# Patient Record
Sex: FEMALE | NOT HISPANIC OR LATINO | Employment: OTHER | URBAN - METROPOLITAN AREA
[De-identification: names, ages, dates, MRNs, and addresses within clinical notes are randomized per-mention and may not be internally consistent; named-entity substitution may affect disease eponyms.]

---

## 2018-07-12 ASSESSMENT — MIFFLIN-ST. JEOR: SCORE: 1687.13

## 2018-07-18 ENCOUNTER — ANESTHESIA - HEALTHEAST (OUTPATIENT)
Dept: SURGERY | Facility: CLINIC | Age: 68
End: 2018-07-18

## 2018-07-18 ENCOUNTER — SURGERY - HEALTHEAST (OUTPATIENT)
Dept: SURGERY | Facility: CLINIC | Age: 68
End: 2018-07-18

## 2018-07-18 ASSESSMENT — MIFFLIN-ST. JEOR: SCORE: 1677.09

## 2018-07-19 ENCOUNTER — HOME CARE/HOSPICE - HEALTHEAST (OUTPATIENT)
Dept: HOME HEALTH SERVICES | Facility: HOME HEALTH | Age: 68
End: 2018-07-19

## 2018-07-22 ENCOUNTER — HOME CARE/HOSPICE - HEALTHEAST (OUTPATIENT)
Dept: HOME HEALTH SERVICES | Facility: HOME HEALTH | Age: 68
End: 2018-07-22

## 2018-07-24 ENCOUNTER — HOME CARE/HOSPICE - HEALTHEAST (OUTPATIENT)
Dept: HOME HEALTH SERVICES | Facility: HOME HEALTH | Age: 68
End: 2018-07-24

## 2018-07-26 ENCOUNTER — HOME CARE/HOSPICE - HEALTHEAST (OUTPATIENT)
Dept: HOME HEALTH SERVICES | Facility: HOME HEALTH | Age: 68
End: 2018-07-26

## 2018-07-27 ENCOUNTER — HOME CARE/HOSPICE - HEALTHEAST (OUTPATIENT)
Dept: HOME HEALTH SERVICES | Facility: HOME HEALTH | Age: 68
End: 2018-07-27

## 2018-07-30 ENCOUNTER — HOME CARE/HOSPICE - HEALTHEAST (OUTPATIENT)
Dept: HOME HEALTH SERVICES | Facility: HOME HEALTH | Age: 68
End: 2018-07-30

## 2018-08-01 ENCOUNTER — HOME CARE/HOSPICE - HEALTHEAST (OUTPATIENT)
Dept: HOME HEALTH SERVICES | Facility: HOME HEALTH | Age: 68
End: 2018-08-01

## 2018-08-03 ENCOUNTER — HOME CARE/HOSPICE - HEALTHEAST (OUTPATIENT)
Dept: HOME HEALTH SERVICES | Facility: HOME HEALTH | Age: 68
End: 2018-08-03

## 2018-08-06 ENCOUNTER — HOME CARE/HOSPICE - HEALTHEAST (OUTPATIENT)
Dept: HOME HEALTH SERVICES | Facility: HOME HEALTH | Age: 68
End: 2018-08-06

## 2018-08-08 ENCOUNTER — HOME CARE/HOSPICE - HEALTHEAST (OUTPATIENT)
Dept: HOME HEALTH SERVICES | Facility: HOME HEALTH | Age: 68
End: 2018-08-08

## 2018-08-10 ENCOUNTER — HOME CARE/HOSPICE - HEALTHEAST (OUTPATIENT)
Dept: HOME HEALTH SERVICES | Facility: HOME HEALTH | Age: 68
End: 2018-08-10

## 2021-06-01 VITALS — WEIGHT: 263 LBS | BODY MASS INDEX: 46.6 KG/M2 | HEIGHT: 63 IN

## 2021-06-19 NOTE — ANESTHESIA POSTPROCEDURE EVALUATION
Patient: Shelby Underwood   LEFT TOTAL KNEE ARTHROPLASTY  Anesthesia type: spinal    Patient location: PACU  Last vitals:   Vitals:    07/18/18 1230   BP: 110/68   Pulse: 73   Resp: 15   Temp:    SpO2: 96%     Post vital signs: stable  Level of consciousness: awake and responds to simple questions  Post-anesthesia pain: pain controlled  Post-anesthesia nausea and vomiting: no  Pulmonary: unassisted, nasal cannula  Cardiovascular: stable and blood pressure at baseline  Hydration: adequate  Anesthetic events: no    QCDR Measures:  ASA# 11 - Swathi-op Cardiac Arrest: ASA11B - Patient did NOT experience unanticipated cardiac arrest  ASA# 12 - Swathi-op Mortality Rate: ASA12B - Patient did NOT die  ASA# 13 - PACU Re-Intubation Rate: NA - No ETT / LMA used for case  ASA# 10 - Composite Anes Safety: ASA10A - No serious adverse event    Additional Notes:

## 2021-06-19 NOTE — ANESTHESIA PROCEDURE NOTES
Spinal Block    Start time: 7/18/2018 9:10 AM  End time: 7/18/2018 9:13 AM  Reason for block: primary anesthetic    Staffing:  Performing  Anesthesiologist: MILA DE LA TORRE    Preanesthetic Checklist  Completed: patient identified, risks, benefits, and alternatives discussed, timeout performed, consent obtained, airway assessed, oxygen available, suction available, emergency drugs available and hand hygiene performed  Spinal Block  Patient position: sitting  Prep: ChloraPrep  Patient monitoring: heart rate, cardiac monitor, continuous pulse ox and blood pressure  Approach: midline  Location: L4-5  Injection technique: single-shot  Needle type: pencil-tip   Needle gauge: 22 G    Assessment  Sensory level: T8

## 2021-06-19 NOTE — ANESTHESIA PREPROCEDURE EVALUATION
Anesthesia Evaluation      Patient summary reviewed   History of anesthetic complications     Airway   Mallampati: II   Pulmonary - normal exam   (+) pneumonia, COPD, asthma                           Cardiovascular   Rhythm: regular  Rate: normal,         Neuro/Psych      Endo/Other       GI/Hepatic/Renal       Other findings:   Asthma    Bipolar disorder (H)   Cellulitis and abscess of oral soft tissues    Cervicalgia   Controlled substance agreement signed    COPD (chronic obstructive pulmonary disease) (H)    Schizoaffective disorder (H)     History of anesthesia complications anxiety upon awakening after previous knee replacement   Osteoarthritis             Dental - normal exam                        Anesthesia Plan  Planned anesthetic: spinal  SAB + SAPH/ TIB BLOCKS  VERSED PRIOR TO PACU  ASA 3     Anesthetic plan and risks discussed with: patient  Anesthesia plan special considerations: antiemetics,   Post-op plan: routine recovery

## 2021-06-19 NOTE — ANESTHESIA PROCEDURE NOTES
Peripheral Block    Patient location during procedure: pre-op  Start time: 7/18/2018 8:00 AM  End time: 7/18/2018 8:05 AM  post-op analgesia per surgeon order as noted in medical record  Staffing:  Performing  Anesthesiologist: MILA DE LA TORRE  Preanesthetic Checklist  Completed: patient identified, site marked, risks, benefits, and alternatives discussed, timeout performed, consent obtained, airway assessed, oxygen available, suction available, emergency drugs available and hand hygiene performed  Peripheral Block  Nerve block type: TIB.  Prep: ChloraPrep  Patient position: supine  Patient monitoring: cardiac monitor, continuous pulse oximetry, heart rate and blood pressure  Laterality: left  Injection technique: ultrasound guided    Ultrasound used to visualize needle placement in proximity to nerve being blocked: yes   Permanent ultrasound image captured for medical record  Sterile gel and probe cover used for ultrasound.    Needle  Needle type: Stimuplex   Needle gauge: 21 G  Needle length: 4 in  no peripheral nerve catheter placed  Assessment  Injection assessment: no difficulty with injection, negative aspiration for heme, no paresthesia on injection and incremental injection

## 2021-06-19 NOTE — ANESTHESIA PROCEDURE NOTES
Peripheral Block    Patient location during procedure: pre-op  Start time: 7/18/2018 8:05 AM  End time: 7/18/2018 8:06 AM    Staffing:  Performing  Anesthesiologist: MILA DE LA TORRE  Preanesthetic Checklist  Completed: patient identified, site marked, risks, benefits, and alternatives discussed, timeout performed, consent obtained, airway assessed, oxygen available, suction available, emergency drugs available and hand hygiene performed  Peripheral Block  Block type: saphenous  Prep: ChloraPrep  Patient position: supine  Patient monitoring: cardiac monitor, continuous pulse oximetry, heart rate and blood pressure  Laterality: left  Injection technique: ultrasound guided    Ultrasound used to visualize needle placement in proximity to nerve being blocked: yes   Permanent ultrasound image captured for medical record  Sterile gel and probe cover used for ultrasound.    Needle  Needle type: Stimuplex   Needle gauge: 21 G  Needle length: 6 in  no peripheral nerve catheter placed  Assessment  Injection assessment: no difficulty with injection, negative aspiration for heme, no paresthesia on injection and incremental injection

## 2021-06-19 NOTE — ANESTHESIA CARE TRANSFER NOTE
Last vitals:   Vitals:    07/18/18 1147   BP: 122/72   Pulse: 80   Resp: 10   Temp: 36.6  C (97.8  F)   SpO2: 97%     Patient's level of consciousness is drowsy  Spontaneous respirations: yes  Maintains airway independently: yes  Dentition unchanged: yes  Oropharynx: oropharynx clear of all foreign objects    QCDR Measures:  ASA# 20 - Surgical Safety Checklist: WHO surgical safety checklist completed prior to induction  PQRS# 430 - Adult PONV Prevention: 4558F - Pt received => 2 anti-emetic agents (different classes) preop & intraop  ASA# 8 - Peds PONV Prevention: NA - Not pediatric patient, not GA or 2 or more risk factors NOT present  PQRS# 424 - Swathi-op Temp Management: 4559F - At least one body temp DOCUMENTED => 35.5C or 95.9F within required timeframe  PQRS# 426 - PACU Transfer Protocol: - Transfer of care checklist used  ASA# 14 - Acute Post-op Pain: ASA14B - Patient did NOT experience pain >= 7 out of 10

## 2022-06-28 ENCOUNTER — TRANSFERRED RECORDS (OUTPATIENT)
Dept: HEALTH INFORMATION MANAGEMENT | Facility: CLINIC | Age: 72
End: 2022-06-28

## 2022-07-08 ENCOUNTER — MEDICAL CORRESPONDENCE (OUTPATIENT)
Dept: HEALTH INFORMATION MANAGEMENT | Facility: CLINIC | Age: 72
End: 2022-07-08

## 2022-07-08 ENCOUNTER — TRANSFERRED RECORDS (OUTPATIENT)
Dept: HEALTH INFORMATION MANAGEMENT | Facility: CLINIC | Age: 72
End: 2022-07-08

## 2022-07-14 ENCOUNTER — TRANSCRIBE ORDERS (OUTPATIENT)
Dept: OTHER | Age: 72
End: 2022-07-14

## 2022-07-14 DIAGNOSIS — M54.2 NECK PAIN: ICD-10-CM

## 2022-07-14 DIAGNOSIS — M79.641 PAIN OF RIGHT HAND: ICD-10-CM

## 2022-07-14 DIAGNOSIS — M62.81 MUSCLE WEAKNESS: Primary | ICD-10-CM

## 2022-08-21 ENCOUNTER — HEALTH MAINTENANCE LETTER (OUTPATIENT)
Age: 72
End: 2022-08-21

## 2022-09-06 ENCOUNTER — OFFICE VISIT (OUTPATIENT)
Dept: NEUROLOGY | Facility: CLINIC | Age: 72
End: 2022-09-06
Payer: COMMERCIAL

## 2022-09-06 VITALS — SYSTOLIC BLOOD PRESSURE: 113 MMHG | HEART RATE: 62 BPM | DIASTOLIC BLOOD PRESSURE: 69 MMHG | OXYGEN SATURATION: 95 %

## 2022-09-06 DIAGNOSIS — M62.81 MUSCLE WEAKNESS: ICD-10-CM

## 2022-09-06 DIAGNOSIS — M79.641 PAIN OF RIGHT HAND: ICD-10-CM

## 2022-09-06 DIAGNOSIS — M54.2 NECK PAIN: ICD-10-CM

## 2022-09-06 PROCEDURE — 99204 OFFICE O/P NEW MOD 45 MIN: CPT | Mod: GC

## 2022-09-06 RX ORDER — CHLORHEXIDINE GLUCONATE ORAL RINSE 1.2 MG/ML
SOLUTION DENTAL
COMMUNITY
Start: 2022-03-24 | End: 2022-12-02

## 2022-09-06 RX ORDER — TRAZODONE HYDROCHLORIDE 50 MG/1
50 TABLET, FILM COATED ORAL
COMMUNITY
Start: 2022-04-04

## 2022-09-06 RX ORDER — LAMOTRIGINE 100 MG/1
100 TABLET ORAL
COMMUNITY
Start: 2022-07-07
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
## 2022-09-06 NOTE — PATIENT INSTRUCTIONS
- We are going to repeat your EMG of your arms since the imaging and recent EMG results don't make sense.  Someone will call you to schedule  - In the meantime, make sure that you keep your hand stretched out to avoid joint problems.

## 2022-09-06 NOTE — PROGRESS NOTES
"Shelby Underwood is a 72 year old female who presents for:  Chief Complaint   Patient presents with     Consult     Right arms pain and weakness (can't lift arm), left leg (thigh) is so weak unable to walk at this time         Initial Vitals:  /69 (BP Location: Right arm, Patient Position: Sitting, Cuff Size: Adult Regular)   Pulse 62   SpO2 95%  Estimated body mass index is 46.59 kg/m  as calculated from the following:    Height as of 7/18/18: 1.6 m (5' 3\").    Weight as of 7/18/18: 119.3 kg (263 lb).. There is no height or weight on file to calculate BSA. BP completed using cuff size: regular      Malena Lake  "

## 2022-09-06 NOTE — Clinical Note
Charisse Riojas would like to have this person called tomorrow to schedule and EMG with her at your clinic. She said she is getting some adjustments made to the schedule and thinks she can get patient in sooner than over here in [place].  Thanks, Malena
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
## 2022-10-05 ENCOUNTER — OFFICE VISIT (OUTPATIENT)
Dept: NEUROLOGY | Facility: CLINIC | Age: 72
End: 2022-10-05
Attending: PSYCHIATRY & NEUROLOGY
Payer: COMMERCIAL

## 2022-10-05 DIAGNOSIS — M62.81 MUSCLE WEAKNESS: ICD-10-CM

## 2022-10-05 DIAGNOSIS — M54.2 NECK PAIN: ICD-10-CM

## 2022-10-05 DIAGNOSIS — M79.641 PAIN OF RIGHT HAND: ICD-10-CM

## 2022-10-05 DIAGNOSIS — G54.0 BRACHIAL PLEXOPATHY: Primary | ICD-10-CM

## 2022-10-05 PROCEDURE — 95911 NRV CNDJ TEST 9-10 STUDIES: CPT | Performed by: PSYCHIATRY & NEUROLOGY

## 2022-10-05 PROCEDURE — 95886 MUSC TEST DONE W/N TEST COMP: CPT | Performed by: PSYCHIATRY & NEUROLOGY

## 2022-10-05 NOTE — LETTER
10/5/2022         RE: Shelby Underwood  [address] [place] [address]        Dear Colleague,    Thank you for referring your patient, Shelby Underwood, to the [facility]. Please see a copy of my visit note below.    See procedure note      Again, thank you for allowing me to participate in the care of your patient.        Sincerely,        Malena Riojas MD

## 2022-10-05 NOTE — PROCEDURES
[facility]  Electrodiagnostic Laboratory                 Department of Neurology                                                                                                         Test Date:  10/5/2022    Patient: Cam Underwood : 1950 Physician: Malena Riojas MD   Sex: Female AGE: 72 year Ref Phys:    ID#: [id number]   Technician: RIDDHI Echeverria     Clinical Information:  The patient presented with complaint sof right arm weakness, on exam right > left weakness    Techniques:  Motor and sensory conduction studies were done with surface recording electrodes. EMG was done with a concentric needle electrode.     Results:  Motor nerve conduction studies:  Left median motor study reveals normal distal latency, amplitude and conduction velocity.  Right median motor study reveals normal distal latency amplitude and conduction velocity.  Left ulnar motor study reveals normal distal latency, low amplitude and normal conduction velocity.  Right ulnar motor study reveals normal distal latency, normal amplitude and conduction velocity.     Sensory nerve conduction studies:  Median antidromic sensory studies bilaterally are within normal limits.  Radial sensory studies compared bilaterally are within normal limits.  Ulnar antidromic studies bilaterally are within normal limits.    Needle examination:  In the left upper extremity the patient had increased insertional activity with fibrillation potentials present in the left FDI.  With activation those motor units appeared large in amplitude and duration with a moderate reduction in requirement.  The remainder of the needle examination of the left upper extremity was normal.  In the right upper extremity the patient had chronic neurogenic changes seen in the right triceps.  In the right flexor carpi ulnaris, FDI and EIP the patient had increased insertional activity with fibrillation potentials present.  She had difficulty  activating the flexor carpi ulnaris but the FDI and EIP showed neurogenic changes present.  The right APB was normal.  Cervical paraspinals were then explored in insertional activity was difficult to obtain clear results due to the patient's inability to relax.  We performed paraspinals with her sitting up due to significant discomfort laying on her side.  I did not appreciate any clear fibrillation potentials present but again the muscle was somewhat tense.  With activation the patient's paraspinals appeared smaller on the right side and C7 and C8 distribution on the left and both amplitude and duration.    Interpretation:    This is an abnormal EMG.  The findings are suggestive of C7 and more so C8 distribution nerve injury in the right upper extremity.  Left upper extremity may also have a mild ulnar neuropathy.  An inferior trunk brachial plexus injury could possibly elicit similar symptoms but is less likely.  The small motor unit seen in the paraspinals are of unclear significance and clinical correlation is advised.    ___________________________  Malena Riojas MD        Nerve Conduction Studies  Motor Sites      Latency Amplitude Neg. Amp Diff Segment Distance Velocity Neg. Dur Neg Area Diff Temperature Comment   Site (ms) Norm (mV) Norm %  cm m/s Norm ms %  C    Left Median (APB) Motor   Wrist 2.7  < 4.4 7.5  > 5.0  Wrist-APB 8   4.6  32.9    Elbow 6.6 - 7.4 - -1.33 Elbow-Wrist 20.5 53  > 48 4.7 -3.3 32.8    Right Median (APB) Motor   Wrist 3.3  < 4.4 5.6  > 5.0  Wrist-APB 8   4.0  32.9    Elbow 7.3 - 5.3 - -5.4 Elbow-Wrist 21 53  > 48 4.1 -0.83 33    Left Ulnar (ADM) Motor   Wrist 3.5  < 3.5 4.5  > 5.0  Wrist-ADM 8   4.9  32.9    Bel Elbow 6.5 - 4.7 - 4.4 Bel Elbow-Wrist 19 63  > 48 5.1 2.7 32.9    Abv Elbow 8.1 - 4.4 - -6.4 Abv Elbow-Bel Elbow 9 56  > 48 5.7 0.67 32.8    Right Ulnar (ADM) Motor   Wrist 2.7  < 3.5 7.5  > 5.0  Wrist-ADM 8   4.6  32.9    Bel Elbow 6.1 - 7.5 - 0 Bel Elbow-Wrist 20 59  > 48  4.5 -1.46 32.9    Abv Elbow 7.9 - 7.0 - -6.7 Abv Elbow-Bel Elbow 10 56  > 48 4.9 -0.99 32.9      Sensory Sites      Onset Lat Peak Lat Amp (O-P) Amp (P-P) Segment Distance Velocity Temperature Comment   Site ms ms  V Norm  V  cm m/s Norm  C    Left Median Sensory   Wrist-Dig II 1.95 2.6 24  > 10 33 Wrist-Dig II 14 72  > 48 32.7    Right Median Sensory   Wrist-Dig II 2.2 3.1 15  > 10 17 Wrist-Dig II 14 64  > 48 32.9    Left Radial Sensory   Forearm-Wrist 1.48 2.0 30  > 15 40 Forearm-Wrist 10 68 - 24.1    Right Radial Sensory   Forearm-Wrist 1.63 2.2 31  > 15 37 Forearm-Wrist 10 61 - 32.9    Left Ulnar Sensory   Wrist-Dig V 1.75 2.4 8  > 8 20 Wrist-Dig V 12.5 71  > 48 32.9    Right Ulnar Sensory   Wrist-Dig V 2.0 2.8 10  > 8 22 Wrist-Dig V 12.5 63  > 48 32.9        Electromyography     Side Muscle Ins Act Fibs/PSW Fasc HF Amp Dur Poly Recrt Int Pat   Left Deltoid Nml None Nml 0 Nml Nml 0 Nml Nml   Left Triceps Nml None Nml 0 Nml Nml 0 Nml Nml   Left Biceps Nml None Nml 0 Nml Nml 0 Nml Nml   Left Pronator Teres Nml None Nml 0 Nml Nml 0 Nml Nml   Left FDI Incr 1+ Nml 0 1+ 1+ 0 ModRed Nml   Right Deltoid Nml None Nml 0 Nml Nml 0 Nml Nml   Right Biceps Nml None Nml 0 Nml Nml 0 Nml Nml   Right Triceps Nml None Nml 0 2+ 2+ 0 ModRed Nml   Right Pronator Teres Nml None Nml 0 Nml Nml 0 Nml Nml   Right FCU Incr 3+ Nml 0 - - - - -   Right FDI Incr 1+ Nml 0 2+ 2+ 2+ Nml Nml   Right EIP Incr 2+ Nml 0 1+ 1+ 0 SevRed Nml   Right APB Nml None Nml 0 Nml Nml 0 Nml Nml   Right C7 Parasp Nml None Nml 0 1- 1-      Right C8 Parasp Nml None Nml 0 1- 1-      Left C8 Parasp Nml None Nml 0 Nml Nml      Left C7 Parasp Nml None Nml 0 Nml Nml            NCS Waveforms:    Motor                Sensory

## 2022-10-10 ENCOUNTER — TELEPHONE (OUTPATIENT)
Dept: NEUROLOGY | Facility: CLINIC | Age: 72
End: 2022-10-10

## 2022-10-10 DIAGNOSIS — M54.12 BRACHIAL NEURITIS OR RADICULITIS: Primary | ICD-10-CM

## 2022-10-10 RX ORDER — DIAZEPAM 5 MG
5 TABLET ORAL
Qty: 1 TABLET | Refills: 0 | Status: SHIPPED | OUTPATIENT
Start: 2022-10-10 | End: 2022-12-02

## 2022-10-17 ENCOUNTER — TELEPHONE (OUTPATIENT)
Dept: NEUROLOGY | Facility: CLINIC | Age: 72
End: 2022-10-17

## 2022-10-17 NOTE — TELEPHONE ENCOUNTER
Called pt to check if she had gotten labs done that were requested by Dr. Riojas. [place].     Carmelina LARSON RN, BSN  [facility] Neurology [facility]

## 2022-10-18 NOTE — TELEPHONE ENCOUNTER
Health Call Center    Phone Message    May a detailed message be left on voicemail: yes     Reason for Call: Other: Patient is returning a call regarding her lab orders. Patient is scheduled to complete labs on 10/24/2022. Please call patient back if there is anything else to relay to the patient at # [phone]     Action Taken: Message routed to:  Other: [facility]     Travel Screening: Not Applicable

## 2022-10-19 NOTE — TELEPHONE ENCOUNTER
No further recommendations at this time. Will close encounter.   Carmelina LARSON RN, BSN  [facility]

## 2022-10-24 ENCOUNTER — HOSPITAL ENCOUNTER (OUTPATIENT)
Dept: MRI IMAGING | Facility: CLINIC | Age: 72
Discharge: HOME | End: 2022-10-24
Attending: PSYCHIATRY & NEUROLOGY
Payer: COMMERCIAL

## 2022-10-24 ENCOUNTER — LAB (OUTPATIENT)
Dept: LAB | Facility: CLINIC | Age: 72
End: 2022-10-24
Attending: PSYCHIATRY & NEUROLOGY
Payer: COMMERCIAL

## 2022-10-24 DIAGNOSIS — G54.0 BRACHIAL PLEXOPATHY: ICD-10-CM

## 2022-10-24 DIAGNOSIS — M62.81 MUSCLE WEAKNESS: ICD-10-CM

## 2022-10-24 DIAGNOSIS — M54.2 NECK PAIN: ICD-10-CM

## 2022-10-24 LAB
CK SERPL-CCNC: 129 U/L (ref 30–225)
CRP SERPL-MCNC: 5.2 MG/L (ref 0–8)
ERYTHROCYTE [SEDIMENTATION RATE] IN BLOOD BY WESTERGREN METHOD: 10 MM/HR (ref 0–30)

## 2022-10-24 PROCEDURE — A9585 GADOBUTROL INJECTION: HCPCS | Performed by: PSYCHIATRY & NEUROLOGY

## 2022-10-24 PROCEDURE — 72141 MRI NECK SPINE W/O DYE: CPT

## 2022-10-24 PROCEDURE — 85652 RBC SED RATE AUTOMATED: CPT

## 2022-10-24 PROCEDURE — 82550 ASSAY OF CK (CPK): CPT

## 2022-10-24 PROCEDURE — 255N000002 HC RX 255 OP 636: Performed by: PSYCHIATRY & NEUROLOGY

## 2022-10-24 PROCEDURE — 36415 COLL VENOUS BLD VENIPUNCTURE: CPT

## 2022-10-24 PROCEDURE — 72156 MRI NECK SPINE W/O & W/DYE: CPT

## 2022-10-24 PROCEDURE — 86140 C-REACTIVE PROTEIN: CPT

## 2022-10-24 RX ORDER — GADOBUTROL 604.72 MG/ML
12 INJECTION INTRAVENOUS ONCE
Status: COMPLETED | OUTPATIENT
Start: 2022-10-24 | End: 2022-10-24

## 2022-10-24 RX ADMIN — GADOBUTROL 12 ML: 604.72 INJECTION INTRAVENOUS at 18:32

## 2022-10-25 ENCOUNTER — OFFICE VISIT (OUTPATIENT)
Dept: NEUROLOGY | Facility: CLINIC | Age: 72
End: 2022-10-25
Payer: COMMERCIAL

## 2022-10-25 VITALS — OXYGEN SATURATION: 95 % | HEART RATE: 79 BPM | SYSTOLIC BLOOD PRESSURE: 128 MMHG | DIASTOLIC BLOOD PRESSURE: 70 MMHG

## 2022-10-25 DIAGNOSIS — M62.81 MUSCLE WEAKNESS OF RIGHT UPPER EXTREMITY: ICD-10-CM

## 2022-10-25 DIAGNOSIS — R29.898 BILATERAL LEG WEAKNESS: Primary | ICD-10-CM

## 2022-10-25 DIAGNOSIS — R29.2 HYPERREFLEXIA: ICD-10-CM

## 2022-10-25 PROCEDURE — 99215 OFFICE O/P EST HI 40 MIN: CPT | Mod: GC

## 2022-10-25 RX ORDER — DULOXETIN HYDROCHLORIDE 20 MG/1
30 CAPSULE, DELAYED RELEASE ORAL DAILY
COMMUNITY
Start: 2022-10-06

## 2022-10-25 NOTE — LETTER
10/25/2022         RE: Shelby Underwood  [address] [place] [address]        Dear Colleague,    Thank you for referring your patient, Shelby Underwood, to the [facility] NEUROLOGY [facility]. Please see a copy of my visit note below.    [facility]  Section of General Neurology  Return Patient Visit    Shelby Underwood MRN# [id number]   Age: 72 year old YOB: 1950     Brief history of symptoms: The patient was initially seen in neurologic consultation on September 6, 2022 for evaluation of right arm dysfunction and weakness with associated neck pain with additional complaint of bilateral hamstring pain. Please see the comprehensive neurologic consultation note from that date in the Epic records for details.     This patient complains of right hand contracture, left C8 distribution numbness, neck pain, and bilateral hamstring discomfort/weakness along with difficulty walking         Assessment and Plan:   Assessment:  Mare Underwood is a 72-year-old patient presenting for follow-up today in regard to her bilateral upper and lower extremity symptoms and neck pain.  Patient recently underwent EMG and nerve conduction study testing, obtained blood work, as well as brachial plexus and cervical spine MRI.  Although some of this patient's symptoms i.e. right upper extremity weakness and numbness can likely be explained by abnormal cervical MRI findings, after further evaluation we cannot rule out upper motor neuron involvement with differential including motor neuron disease (bilateral hypothenar/right FDI atrophy, thenar muscles fasciculations, and abnormally brisk reflexes of the right upper extremity) versus brain lesion.  MRI not showing significant spinal cord compression, and would expect patient to be hyporeflexic with an associated nerve root impingement.  In addition, this patient is complaining of bilateral hamstring weakness and discomfort that began at  about the same time as her upper extremity symptoms.  Further investigation required.  We will plan on ordering brain, cervical, thoracic, and lumbar MRI for now.  If imaging is negative, we will plan on neuromuscular referral with additional bilateral upper and lower extremity EMG to evaluate for motor neuron disease.     Plan:  -MRI brain, cervical, thoracic, and lumbar spine  -Follow-up once testing is done to discuss results and next steps       Kwan Ernandez DO  Resident Physician of Neurology  [facility]      Interval history:   Patient states that her symptoms have been stable over the last month or so.  No new complaints today.  Patient still getting around with a wheelchair as she cannot  Walk long distances due to her hamstring weakness.  Patient states she has been trying to exercise and stretch her contracture of her right hand.        Past Medical History:     Patient Active Problem List   Diagnosis     Brachial neuritis or radiculitis     No past medical history on file.     Past Surgical History:     Past Surgical History:   Procedure Laterality Date     ARTHROPLASTY KNEE Right      COLONOSCOPY      diagnostic     HYSTERECTOMY TOTAL ABDOMINAL, BILATERAL SALPINGO-OOPHORECTOMY, COMBINED      fibroids     POLYPECTOMY      w/colonoscopy     TUBAL LIGATION       WISDOM TOOTH EXTRACTION       ZZC TOTAL KNEE ARTHROPLASTY Left 2018    Procedure:  LEFT TOTAL KNEE ARTHROPLASTY;  Surgeon: Dudley De La Torre MD;  Location: [facility];  Service: Orthopedics        Social History:     Social History     Tobacco Use     Smoking status: Former     Packs/day: 1.00     Years: 40.00     Pack years: 40.00     Types: Cigarettes     Quit date: 10/1/2009     Years since quittin.0     Smokeless tobacco: Never   Substance Use Topics     Alcohol use: Yes     Comment: Alcoholic Drinks/day: sparingly     Drug use: No        Family History:   No family history on file.      Medications:     Current Outpatient Medications   Medication Sig     albuterol (PROAIR HFA;PROVENTIL HFA;VENTOLIN HFA) 90 mcg/actuation inhaler [ALBUTEROL (PROAIR HFA;PROVENTIL HFA;VENTOLIN HFA) 90 MCG/ACTUATION INHALER] Inhale 2 puffs every 6 (six) hours as needed.     ALPRAZolam (XANAX) 0.5 MG tablet [ALPRAZOLAM (XANAX) 0.5 MG TABLET] Take 0.5 mg by mouth daily as needed for anxiety. Intense anxiety     ARIPiprazole (ABILIFY) 5 MG tablet [ARIPIPRAZOLE (ABILIFY) 5 MG TABLET] Take 5 mg by mouth daily.     cholecalciferol, vitamin D3, 1,000 unit tablet [CHOLECALCIFEROL, VITAMIN D3, 1,000 UNIT TABLET] Take 1,000 Units by mouth daily.     clonazePAM (KLONOPIN) 1 MG tablet [CLONAZEPAM (KLONOPIN) 1 MG TABLET] Take 1 mg by mouth at bedtime.     diazepam (VALIUM) 5 MG tablet Take 1 tablet (5 mg) by mouth once as needed for anxiety (for mri)     DULoxetine (CYMBALTA) 20 MG capsule Take 40 mg by mouth daily Take 2 tabs by mouth every am     fluocinonide (LIDEX) 0.05 % cream [FLUOCINONIDE (LIDEX) 0.05 % CREAM] Apply 1 application topically 2 (two) times a day as needed (to eczema on ears).      lamoTRIgine (LAMICTAL) 100 MG tablet Take 100 mg by mouth     traZODone (DESYREL) 50 MG tablet Take 50 mg by mouth     acetaminophen (TYLENOL) 500 MG tablet [ACETAMINOPHEN (TYLENOL) 500 MG TABLET] Take 1,000 mg by mouth every 6 (six) hours as needed.      albuterol (PROVENTIL) 2.5 mg /3 mL (0.083 %) nebulizer solution [ALBUTEROL (PROVENTIL) 2.5 MG /3 ML (0.083 %) NEBULIZER SOLUTION] Inhale 2.5 mg every 4 (four) hours as needed.     aspirin 325 MG tablet [ASPIRIN 325 MG TABLET] Take 1 tablet (325 mg total) by mouth 2 (two) times a day.     chlorhexidine (PERIDEX) 0.12 % solution  (Patient not taking: Reported on 10/25/2022)     oxyCODONE (ROXICODONE) 5 MG immediate release tablet [OXYCODONE (ROXICODONE) 5 MG IMMEDIATE RELEASE TABLET] Take 1-2 tablets (5-10 mg total) by mouth every 4 (four) hours as needed.     PARoxetine (PAXIL) 40 MG  tablet [PAROXETINE (PAXIL) 40 MG TABLET] Take 40 mg by mouth at bedtime.     senna-docusate (PERICOLACE) 8.6-50 mg tablet [SENNA-DOCUSATE (PERICOLACE) 8.6-50 MG TABLET] Take 1 tablet by mouth 2 (two) times a day.     No current facility-administered medications for this visit.        Allergies:     Allergies   Allergen Reactions     Naproxen Unknown     GI bleed, Still takes Aleve as needed          Physical Exam:   Vitals: /70 (BP Location: Right arm, Patient Position: Sitting, Cuff Size: Adult Regular)   Pulse 79   SpO2 95%   CV: peripheral pulse appreciated  Lungs: breathing comfortably  Extremities: no edema  Skin: No rashes    Neuro:   General Appearance: No apparent distress, pleasant     Mental Status:  Speech fluent and comprehension intact. No dysarthria. Normal memory, fund of knowledge, attention/concentration, and language    Cranial Nerves:   Grossly intact     Motor Exam:   Upper Extremities  Deltoid  Bicep  Tricep  Wrist Extensors  strength Intrinsic Muscles    Right  5  5  5  5 4 3   Left  5  5  5  5 5 5      Lower Extremities  Hip Flexors  Knee Extensors  Knee   Flexors  Dorsi Flexion  Plantar   Flexion    Right  5  5  5  5  5    Left  5  5  5  5  5      Contracture visualized of the third fourth and fifth digits of the right upper extremity    Fasciculations of the right thenar eminence visualized after tapping with reflex hammer    Muscle atrophy noted of the hypothenar eminences bilaterally, and the right first dorsal interosseous    Sensory: Sensation diminished to light touch in right upper extremity in the C7-C8 nerve distributions, and also diminished in the C8 distribution on the left    Reflexes: Brachioradialis and biceps reflexes 3+ in the right upper extremity.  Achilles 1+ bilaterally, patellar's 2+ bilaterally, left brachioradialis, biceps, and triceps are 2+.    Gait: Patient in wheelchair on arrival, able to take a few steps to walk to exam table.  Official gait exam  deferred.         Data: Pertinent prior to visit   Imaging:  MRI cervical spine   IMPRESSION:  1.  Similar multilevel cervical spondylotic changes with moderate spinal canal stenosis at C6-C7, mild at C4-C6.  2.  Varying degrees of foraminal narrowing, worst and severe at C4-C5 on the left and C6-C7 on the right, the latter potentially explain patient's symptoms. Moderate at multiple additional levels, as detailed.    MRI right Claudia brachial plexus - unremarkable    Procedures:  EMG and nerve conduction study -  This is an abnormal EMG.  The findings are suggestive of C7 and more so C8 distribution nerve injury in the right upper extremity.  Left upper extremity may also have a mild ulnar neuropathy.  An inferior trunk brachial plexus injury could possibly elicit similar symptoms but is less likely.  The small motor unit seen in the paraspinals are of unclear significance and clinical correlation is advised.    Laboratory:  CRP 5.2  ESR 10  CK total 129                 Attestation with edits by Yonny Levin MD at 10/25/2022 10:04 PM:  Attending physician attestation.   I have personally reviewed/edited documentation and confirmed pertinent history and physical examination as documented. I agree with assessment and plan with the following comments: 72-year-old female patient with 1 year history of right upper extremity weakness and bilateral subjective lower extremity weakness affecting her ability to walk.  On exam she has evidence of upper and lower motor neuron involvement in the right upper extremity that should be further investigated for possibility of motor neuron conditions, brain lesions, cervical cord involvement (her cervical spine MRI demonstrated questionable T2 hyperintensities on sagittal images that might be artificial, but could also be suggestive of demyelinating lesions), lesions of the thoracic spinal cord, and lumbar spine pathology.  These additional differential  possibilities might be superimposed on bilateral cervical radiculopathy that potentially might require surgical treatment.  Total time is 51-minute spent in chart review, analyzing and personally interpreting EMG data, reviewing and personally interpreting cervical spine/brachial plexus MRI images, patient evaluation, and documentation.    Yonny Levin MD.        Again, thank you for allowing me to participate in the care of your patient.        Sincerely,        Kwan Ernandez MD

## 2022-10-25 NOTE — PATIENT INSTRUCTIONS
Your neurological clinical exam is showing signs that don't completely align with your HANNA findings.  We will order some additional scans to get to the bottom of your symptoms.    - We will get multiple MRI scans of your brain, neck, thoracic, and lumbar spine.

## 2022-11-13 ENCOUNTER — HOSPITAL ENCOUNTER (OUTPATIENT)
Dept: MRI IMAGING | Facility: CLINIC | Age: 72
Discharge: HOME | End: 2022-11-13
Attending: PSYCHIATRY & NEUROLOGY
Payer: COMMERCIAL

## 2022-11-13 DIAGNOSIS — R29.898 BILATERAL LEG WEAKNESS: ICD-10-CM

## 2022-11-13 DIAGNOSIS — R29.2 HYPERREFLEXIA: ICD-10-CM

## 2022-11-13 PROCEDURE — 72157 MRI CHEST SPINE W/O & W/DYE: CPT

## 2022-11-13 PROCEDURE — A9585 GADOBUTROL INJECTION: HCPCS | Performed by: PSYCHIATRY & NEUROLOGY

## 2022-11-13 PROCEDURE — 70553 MRI BRAIN STEM W/O & W/DYE: CPT

## 2022-11-13 PROCEDURE — 72148 MRI LUMBAR SPINE W/O DYE: CPT

## 2022-11-13 PROCEDURE — 72156 MRI NECK SPINE W/O & W/DYE: CPT

## 2022-11-13 PROCEDURE — 255N000002 HC RX 255 OP 636: Performed by: PSYCHIATRY & NEUROLOGY

## 2022-11-13 RX ORDER — GADOBUTROL 604.72 MG/ML
12 INJECTION INTRAVENOUS ONCE
Status: COMPLETED | OUTPATIENT
Start: 2022-11-13 | End: 2022-11-13

## 2022-11-13 RX ADMIN — GADOBUTROL 12 ML: 604.72 INJECTION INTRAVENOUS at 11:34

## 2022-11-22 ENCOUNTER — OFFICE VISIT (OUTPATIENT)
Dept: NEUROLOGY | Facility: CLINIC | Age: 72
End: 2022-11-22
Payer: COMMERCIAL

## 2022-11-22 VITALS
WEIGHT: 263 LBS | SYSTOLIC BLOOD PRESSURE: 126 MMHG | BODY MASS INDEX: 46.6 KG/M2 | OXYGEN SATURATION: 95 % | HEART RATE: 62 BPM | DIASTOLIC BLOOD PRESSURE: 76 MMHG | HEIGHT: 63 IN

## 2022-11-22 DIAGNOSIS — E66.01 MORBID OBESITY (H): ICD-10-CM

## 2022-11-22 DIAGNOSIS — M48.062 SPINAL STENOSIS OF LUMBAR REGION WITH NEUROGENIC CLAUDICATION: Primary | ICD-10-CM

## 2022-11-22 PROCEDURE — 99213 OFFICE O/P EST LOW 20 MIN: CPT | Mod: GC | Performed by: PSYCHIATRY & NEUROLOGY

## 2022-11-22 NOTE — PROGRESS NOTES
[facility]  Section of General Neurology  Return Patient Visit     Shelby Underwood MRN# [id number]   Age: 72 year old YOB: 1950      Brief history of symptoms: The patient was initially seen in neurologic consultation on September 6, 2022 for evaluation of right arm dysfunction and weakness with associated neck pain with additional complaint of bilateral hamstring pain. Please see the comprehensive neurologic consultation note from that date in the Epic records for details.      This patient complains of right hand contracture, left C8 distribution numbness, neck pain, and bilateral hamstring discomfort/weakness along with difficulty walking          Assessment and Plan:   Assessment:  Mare Underwood is a 72-year-old patient presenting for follow-up today in regard to her bilateral upper and lower extremity symptoms and neck pain.   Due to concern for possible upper motor neuron involvement, patient was sent for brain, cervical, thoracic, and lumbar MRI imaging.  Notable concerning findings seen on both the cervical and lumbar spine MRI images.  Brain MRI and thoracic spine unremarkable.  Plan to refer patient to neurosurgery for evaluation.  Patient's leg symptoms are likely related to lumbar spinal stenosis.  The relationship of her right upper extremity contracture and weakness with her cervical spine MRI findings remain unclear.  Will have neurosurgery weigh in on this as well.  It could be possible that this patient is exhibiting an anatomical variation in regards to nerve roots and their associated myotomes of the right upper extremity.  Still cannot definitively rule out motor neuron disease at this time.  We will follow-up with patient in 3 months time to discuss neurosurgery's input.  We will consider neuromuscular specialty referral at that time.    Patient's exam stable from last visit in October.    Plan:  -Referral to neurosurgery for surgical evaluation of lumbar and  cervical spine  -Follow-up in 3 months with video visit        Kwan Ernandez DO  Resident Physician of Neurology  [facility]       Interval history:   Patient states that her symptoms have been stable over the last month or so, if not mildly improved today. No new complaints or new focal deficits.  Patient still getting around with a wheelchair as she cannot walk long distances due to her hamstring weakness.  Patient states she has been trying to exercise and stretch her contracture of her right hand.  She states that she would not be interested in continued therapy as it has not helped her and has made her feel more frustrated.          Past Medical History:          Patient Active Problem List   Diagnosis     Brachial neuritis or radiculitis   Low back pain  Lumbar spinal stenosis  Neck pain  Bipolar disorder  Osteoarthritis    Past Medical History   No past medical history on file.         Past Surgical History:      Past Surgical History         Past Surgical History:   Procedure Laterality Date     ARTHROPLASTY KNEE Right      COLONOSCOPY         diagnostic     HYSTERECTOMY TOTAL ABDOMINAL, BILATERAL SALPINGO-OOPHORECTOMY, COMBINED         fibroids     POLYPECTOMY         w/colonoscopy     TUBAL LIGATION         WISDOM TOOTH EXTRACTION         ZZC TOTAL KNEE ARTHROPLASTY Left 2018     Procedure:  LEFT TOTAL KNEE ARTHROPLASTY;  Surgeon: Dudley De La Torre MD;  Location: [facility];  Service: Orthopedics             Social History:      Social History            Tobacco Use     Smoking status: Former       Packs/day: 1.00       Years: 40.00       Pack years: 40.00       Types: Cigarettes       Quit date: 10/1/2009       Years since quittin.0     Smokeless tobacco: Never   Substance Use Topics     Alcohol use: Yes       Comment: Alcoholic Drinks/day: sparingly     Drug use: No          Family History:   Family History   No family history on file.          Medications:           Current Outpatient Medications   Medication Sig     albuterol (PROAIR HFA;PROVENTIL HFA;VENTOLIN HFA) 90 mcg/actuation inhaler [ALBUTEROL (PROAIR HFA;PROVENTIL HFA;VENTOLIN HFA) 90 MCG/ACTUATION INHALER] Inhale 2 puffs every 6 (six) hours as needed.     ALPRAZolam (XANAX) 0.5 MG tablet [ALPRAZOLAM (XANAX) 0.5 MG TABLET] Take 0.5 mg by mouth daily as needed for anxiety. Intense anxiety     ARIPiprazole (ABILIFY) 5 MG tablet [ARIPIPRAZOLE (ABILIFY) 5 MG TABLET] Take 5 mg by mouth daily.     cholecalciferol, vitamin D3, 1,000 unit tablet [CHOLECALCIFEROL, VITAMIN D3, 1,000 UNIT TABLET] Take 1,000 Units by mouth daily.     clonazePAM (KLONOPIN) 1 MG tablet [CLONAZEPAM (KLONOPIN) 1 MG TABLET] Take 1 mg by mouth at bedtime.     diazepam (VALIUM) 5 MG tablet Take 1 tablet (5 mg) by mouth once as needed for anxiety (for mri)     DULoxetine (CYMBALTA) 20 MG capsule Take 40 mg by mouth daily Take 2 tabs by mouth every am     fluocinonide (LIDEX) 0.05 % cream [FLUOCINONIDE (LIDEX) 0.05 % CREAM] Apply 1 application topically 2 (two) times a day as needed (to eczema on ears).      lamoTRIgine (LAMICTAL) 100 MG tablet Take 100 mg by mouth     traZODone (DESYREL) 50 MG tablet Take 50 mg by mouth     acetaminophen (TYLENOL) 500 MG tablet [ACETAMINOPHEN (TYLENOL) 500 MG TABLET] Take 1,000 mg by mouth every 6 (six) hours as needed.      albuterol (PROVENTIL) 2.5 mg /3 mL (0.083 %) nebulizer solution [ALBUTEROL (PROVENTIL) 2.5 MG /3 ML (0.083 %) NEBULIZER SOLUTION] Inhale 2.5 mg every 4 (four) hours as needed.     aspirin 325 MG tablet [ASPIRIN 325 MG TABLET] Take 1 tablet (325 mg total) by mouth 2 (two) times a day.     chlorhexidine (PERIDEX) 0.12 % solution  (Patient not taking: Reported on 10/25/2022)     oxyCODONE (ROXICODONE) 5 MG immediate release tablet [OXYCODONE (ROXICODONE) 5 MG IMMEDIATE RELEASE TABLET] Take 1-2 tablets (5-10 mg total) by mouth every 4 (four) hours as needed.     PARoxetine (PAXIL)  "40 MG tablet [PAROXETINE (PAXIL) 40 MG TABLET] Take 40 mg by mouth at bedtime.     senna-docusate (PERICOLACE) 8.6-50 mg tablet [SENNA-DOCUSATE (PERICOLACE) 8.6-50 MG TABLET] Take 1 tablet by mouth 2 (two) times a day.      No current facility-administered medications for this visit.          Allergies:            Allergies   Allergen Reactions     Naproxen Unknown       GI bleed, Still takes Aleve as needed             Physical Exam:   /76   Pulse 62   Ht 1.6 m (5' 3\")   Wt 119.3 kg (263 lb)   SpO2 95%   BMI 46.59 kg/m    Lungs: breathing comfortably  Extremities: no edema  Skin: No rashes     Neuro:   General Appearance: No apparent distress, pleasant      Mental Status:  Speech fluent and comprehension intact. No dysarthria. Normal memory, fund of knowledge, attention/concentration, and language     Cranial Nerves:   Grossly intact     Motor Exam:   Upper Extremities  Deltoid  Bicep  Tricep  Wrist Extensors  strength Intrinsic Muscles    Right  5  5  5  5 4 3   Left  5  5  5  5 5 5       Lower Extremities  Hip Flexors  Knee Extensors  Knee   Flexors  Dorsi Flexion  Plantar   Flexion    Right  5  5  5  5  5    Left  5  5  5  5  5       Contracture visualized of the third fourth and fifth digits of the right upper extremity     Fasciculations of the right thenar eminence visualized after tapping with reflex hammer     Muscle atrophy noted of the hypothenar eminences bilaterally, and the right first dorsal interosseous     Sensory: Sensation diminished to light touch in right upper extremity in the C7-C8 nerve distributions, and also diminished in the C8 distribution on the left     Reflexes: Brachioradialis and biceps reflexes 3+ in the right upper extremity.  Achilles 1+ bilaterally, patellar's 2+ bilaterally, left brachioradialis, biceps, and triceps are 2+.     Gait: Patient in wheelchair.  Official gait exam deferred.          Data: Pertinent prior to visit   Imaging:    MRI " brain  IMPRESSION:  1.  Normal MRI of the brain parenchyma.  2.  Mucosal thickening in the paranasal sinuses.    MRI cervical spine   IMPRESSION:  1.  Multilevel degenerative changes in the cervical spine as detailed above. Overall, degenerative findings appear similar to prior MR 10/24/2022.  2.  No abnormal T2 signal or enhancement appreciated in the cervical spinal cord.  3.  Mild spinal canal narrowings at C4-C5 and C5-C6.  4.  Multiple levels of neural foraminal narrowing with particular narrowings bilaterally at C3-C4, left greater than right at C4-C5, right greater than left at C6-C7, and on the left at C7-T1.  5.  Nodule in the left thyroid gland measuring up to at least 2.8 cm. Recommend further evaluation with thyroid ultrasound if this has not previously been performed.    MRI thoracic spine  IMPRESSION:  1.  No abnormal signal or enhancement appreciated in the thoracic spinal cord.  2.  Multilevel degenerative changes throughout the thoracic spine as detailed above.  3.  Mild spinal canal narrowing at T11-T12.  4.  Several levels of neural foraminal narrowing with particular narrowings bilaterally at T1-T2, on the right at T4-T5, and bilaterally at T11-T12.    MRI Lumbar Spine  IMPRESSION:  1.  Marked multilevel degenerative changes throughout the lumbar spine as detailed above. Grade 1 anterolisthesis of L3 on L4 and L4 on L5 likely due to degenerative facet arthropathy.  2.  Severe spinal canal narrowing at L3-L4 with moderate-to-severe spinal canal narrowings at L2-L3 and L4-L5. Mild spinal canal narrowing at L1-L2.  3.  Multiple levels of neural foraminal narrowing, most pronounced on the left at L5-S1.  4.  Mild edema around the degenerated bilateral L4-L5 facets.  5.  Overall, degenerative findings appear similar to prior MR 5/13/2021.  MRI right Claudia brachial plexus - unremarkable     Procedures:  EMG and nerve conduction study -  This is an abnormal EMG.  The findings are suggestive of C7 and  more so C8 distribution nerve injury in the right upper extremity.  Left upper extremity may also have a mild ulnar neuropathy.  An inferior trunk brachial plexus injury could possibly elicit similar symptoms but is less likely.  The small motor unit seen in the paraspinals are of unclear significance and clinical correlation is advised.    Laboratory:  Reviewed

## 2022-11-22 NOTE — LETTER
11/22/2022         RE: Shelby Underwood  [address] [place] [address]        Dear Colleague,    Thank you for referring your patient, Shelby Underwood, to the [facility] NEUROLOGY CLINICS [facility]. Please see a copy of my visit note below.    [facility]  Section of General Neurology  Return Patient Visit     Shelby Underwood MRN# [id number]   Age: 72 year old YOB: 1950      Brief history of symptoms: The patient was initially seen in neurologic consultation on September 6, 2022 for evaluation of right arm dysfunction and weakness with associated neck pain with additional complaint of bilateral hamstring pain. Please see the comprehensive neurologic consultation note from that date in the Epic records for details.      This patient complains of right hand contracture, left C8 distribution numbness, neck pain, and bilateral hamstring discomfort/weakness along with difficulty walking          Assessment and Plan:   Assessment:  Mare Underwood is a 72-year-old patient presenting for follow-up today in regard to her bilateral upper and lower extremity symptoms and neck pain.   Due to concern for possible upper motor neuron involvement, patient was sent for brain, cervical, thoracic, and lumbar MRI imaging.  Notable concerning findings seen on both the cervical and lumbar spine MRI images.  Brain MRI and thoracic spine unremarkable.  Plan to refer patient to neurosurgery for evaluation.  Patient's leg symptoms are likely related to lumbar spinal stenosis.  The relationship of her right upper extremity contracture and weakness with her cervical spine MRI findings remain unclear.  Will have neurosurgery weigh in on this as well.  It could be possible that this patient is exhibiting an anatomical variation in regards to nerve roots and their associated myotomes of the right upper extremity.  Still cannot definitively rule out motor neuron disease at this time.  We will  follow-up with patient in 3 months time to discuss neurosurgery's input.  We will consider neuromuscular specialty referral at that time.    Patient's exam stable from last visit in October.    Plan:  -Referral to neurosurgery for surgical evaluation of lumbar and cervical spine  -Follow-up in 3 months with video visit        Kwan Ernandez DO  Resident Physician of Neurology  [facility]       Interval history:   Patient states that her symptoms have been stable over the last month or so, if not mildly improved today. No new complaints or new focal deficits.  Patient still getting around with a wheelchair as she cannot walk long distances due to her hamstring weakness.  Patient states she has been trying to exercise and stretch her contracture of her right hand.  She states that she would not be interested in continued therapy as it has not helped her and has made her feel more frustrated.          Past Medical History:          Patient Active Problem List   Diagnosis     Brachial neuritis or radiculitis   Low back pain  Lumbar spinal stenosis  Neck pain  Bipolar disorder  Osteoarthritis    Past Medical History   No past medical history on file.         Past Surgical History:      Past Surgical History         Past Surgical History:   Procedure Laterality Date     ARTHROPLASTY KNEE Right 2014     COLONOSCOPY         diagnostic     HYSTERECTOMY TOTAL ABDOMINAL, BILATERAL SALPINGO-OOPHORECTOMY, COMBINED         fibroids     POLYPECTOMY         w/colonoscopy     TUBAL LIGATION         WISDOM TOOTH EXTRACTION         ZZC TOTAL KNEE ARTHROPLASTY Left 7/18/2018     Procedure:  LEFT TOTAL KNEE ARTHROPLASTY;  Surgeon: Dudley De La Torre MD;  Location: [facility];  Service: Orthopedics             Social History:      Social History            Tobacco Use     Smoking status: Former       Packs/day: 1.00       Years: 40.00       Pack years: 40.00       Types: Cigarettes       Quit date:  10/1/2009       Years since quittin.0     Smokeless tobacco: Never   Substance Use Topics     Alcohol use: Yes       Comment: Alcoholic Drinks/day: sparingly     Drug use: No          Family History:   Family History   No family history on file.         Medications:           Current Outpatient Medications   Medication Sig     albuterol (PROAIR HFA;PROVENTIL HFA;VENTOLIN HFA) 90 mcg/actuation inhaler [ALBUTEROL (PROAIR HFA;PROVENTIL HFA;VENTOLIN HFA) 90 MCG/ACTUATION INHALER] Inhale 2 puffs every 6 (six) hours as needed.     ALPRAZolam (XANAX) 0.5 MG tablet [ALPRAZOLAM (XANAX) 0.5 MG TABLET] Take 0.5 mg by mouth daily as needed for anxiety. Intense anxiety     ARIPiprazole (ABILIFY) 5 MG tablet [ARIPIPRAZOLE (ABILIFY) 5 MG TABLET] Take 5 mg by mouth daily.     cholecalciferol, vitamin D3, 1,000 unit tablet [CHOLECALCIFEROL, VITAMIN D3, 1,000 UNIT TABLET] Take 1,000 Units by mouth daily.     clonazePAM (KLONOPIN) 1 MG tablet [CLONAZEPAM (KLONOPIN) 1 MG TABLET] Take 1 mg by mouth at bedtime.     diazepam (VALIUM) 5 MG tablet Take 1 tablet (5 mg) by mouth once as needed for anxiety (for mri)     DULoxetine (CYMBALTA) 20 MG capsule Take 40 mg by mouth daily Take 2 tabs by mouth every am     fluocinonide (LIDEX) 0.05 % cream [FLUOCINONIDE (LIDEX) 0.05 % CREAM] Apply 1 application topically 2 (two) times a day as needed (to eczema on ears).      lamoTRIgine (LAMICTAL) 100 MG tablet Take 100 mg by mouth     traZODone (DESYREL) 50 MG tablet Take 50 mg by mouth     acetaminophen (TYLENOL) 500 MG tablet [ACETAMINOPHEN (TYLENOL) 500 MG TABLET] Take 1,000 mg by mouth every 6 (six) hours as needed.      albuterol (PROVENTIL) 2.5 mg /3 mL (0.083 %) nebulizer solution [ALBUTEROL (PROVENTIL) 2.5 MG /3 ML (0.083 %) NEBULIZER SOLUTION] Inhale 2.5 mg every 4 (four) hours as needed.     aspirin 325 MG tablet [ASPIRIN 325 MG TABLET] Take 1 tablet (325 mg total) by mouth 2 (two) times a day.     chlorhexidine (PERIDEX) 0.12 %  "solution  (Patient not taking: Reported on 10/25/2022)     oxyCODONE (ROXICODONE) 5 MG immediate release tablet [OXYCODONE (ROXICODONE) 5 MG IMMEDIATE RELEASE TABLET] Take 1-2 tablets (5-10 mg total) by mouth every 4 (four) hours as needed.     PARoxetine (PAXIL) 40 MG tablet [PAROXETINE (PAXIL) 40 MG TABLET] Take 40 mg by mouth at bedtime.     senna-docusate (PERICOLACE) 8.6-50 mg tablet [SENNA-DOCUSATE (PERICOLACE) 8.6-50 MG TABLET] Take 1 tablet by mouth 2 (two) times a day.      No current facility-administered medications for this visit.          Allergies:            Allergies   Allergen Reactions     Naproxen Unknown       GI bleed, Still takes Aleve as needed             Physical Exam:   /76   Pulse 62   Ht 1.6 m (5' 3\")   Wt 119.3 kg (263 lb)   SpO2 95%   BMI 46.59 kg/m    Lungs: breathing comfortably  Extremities: no edema  Skin: No rashes     Neuro:   General Appearance: No apparent distress, pleasant      Mental Status:  Speech fluent and comprehension intact. No dysarthria. Normal memory, fund of knowledge, attention/concentration, and language     Cranial Nerves:   Grossly intact     Motor Exam:   Upper Extremities  Deltoid  Bicep  Tricep  Wrist Extensors  strength Intrinsic Muscles    Right  5  5  5  5 4 3   Left  5  5  5  5 5 5       Lower Extremities  Hip Flexors  Knee Extensors  Knee   Flexors  Dorsi Flexion  Plantar   Flexion    Right  5  5  5  5  5    Left  5  5  5  5  5       Contracture visualized of the third fourth and fifth digits of the right upper extremity     Fasciculations of the right thenar eminence visualized after tapping with reflex hammer     Muscle atrophy noted of the hypothenar eminences bilaterally, and the right first dorsal interosseous     Sensory: Sensation diminished to light touch in right upper extremity in the C7-C8 nerve distributions, and also diminished in the C8 distribution on the left     Reflexes: Brachioradialis and biceps reflexes 3+ in the " right upper extremity.  Achilles 1+ bilaterally, patellar's 2+ bilaterally, left brachioradialis, biceps, and triceps are 2+.     Gait: Patient in wheelchair.  Official gait exam deferred.          Data: Pertinent prior to visit   Imaging:    MRI brain  IMPRESSION:  1.  Normal MRI of the brain parenchyma.  2.  Mucosal thickening in the paranasal sinuses.    MRI cervical spine   IMPRESSION:  1.  Multilevel degenerative changes in the cervical spine as detailed above. Overall, degenerative findings appear similar to prior MR 10/24/2022.  2.  No abnormal T2 signal or enhancement appreciated in the cervical spinal cord.  3.  Mild spinal canal narrowings at C4-C5 and C5-C6.  4.  Multiple levels of neural foraminal narrowing with particular narrowings bilaterally at C3-C4, left greater than right at C4-C5, right greater than left at C6-C7, and on the left at C7-T1.  5.  Nodule in the left thyroid gland measuring up to at least 2.8 cm. Recommend further evaluation with thyroid ultrasound if this has not previously been performed.    MRI thoracic spine  IMPRESSION:  1.  No abnormal signal or enhancement appreciated in the thoracic spinal cord.  2.  Multilevel degenerative changes throughout the thoracic spine as detailed above.  3.  Mild spinal canal narrowing at T11-T12.  4.  Several levels of neural foraminal narrowing with particular narrowings bilaterally at T1-T2, on the right at T4-T5, and bilaterally at T11-T12.    MRI Lumbar Spine  IMPRESSION:  1.  Marked multilevel degenerative changes throughout the lumbar spine as detailed above. Grade 1 anterolisthesis of L3 on L4 and L4 on L5 likely due to degenerative facet arthropathy.  2.  Severe spinal canal narrowing at L3-L4 with moderate-to-severe spinal canal narrowings at L2-L3 and L4-L5. Mild spinal canal narrowing at L1-L2.  3.  Multiple levels of neural foraminal narrowing, most pronounced on the left at L5-S1.  4.  Mild edema around the degenerated bilateral L4-L5  facets.  5.  Overall, degenerative findings appear similar to prior MR 5/13/2021.  MRI right Claudia brachial plexus - unremarkable     Procedures:  EMG and nerve conduction study -  This is an abnormal EMG.  The findings are suggestive of C7 and more so C8 distribution nerve injury in the right upper extremity.  Left upper extremity may also have a mild ulnar neuropathy.  An inferior trunk brachial plexus injury could possibly elicit similar symptoms but is less likely.  The small motor unit seen in the paraspinals are of unclear significance and clinical correlation is advised.    Laboratory:  Reviewed         Attestation signed by Malena Riojas MD at 11/29/2022  7:36 PM:  I met with the patient, reviewed imaging and checked exam.  I see no fasciculations.   She does have some brisk reflexes but I suspect this can be attributed to her cervical disc disease. We will monitor with serial exam for now.    She has significant lumbar spinal stenosis and endorses pseudoclaudication so I recommend a neurosurgical consultation. She wants to be seen in [place].     Malena Riojas MD      Again, thank you for allowing me to participate in the care of your patient.        Sincerely,        Kwan Ernandez MD

## 2022-11-22 NOTE — NURSING NOTE
"Shelby Underwood is a 72 year old female who presents for:  Chief Complaint   Patient presents with     Extremity Weakness     Bilateral leg weakness upper/lower extremities  MRI Results        Initial Vitals:  /76   Pulse 62   Ht 1.6 m (5' 3\")   Wt 119.3 kg (263 lb)   SpO2 95%   BMI 46.59 kg/m   Estimated body mass index is 46.59 kg/m  as calculated from the following:    Height as of this encounter: 1.6 m (5' 3\").    Weight as of this encounter: 119.3 kg (263 lb).. Body surface area is 2.3 meters squared. BP completed using cuff size: regular        Janeth Rathai  "

## 2022-11-22 NOTE — PATIENT INSTRUCTIONS
- You should see a spine surgeon for your low back.  You have severe low back spine disease.  These surgeons can also give an opinion on your neck spine as well.    - Follow up to see how you are doing in 3 months and talk about what the spine surgeons have to say.  We would prefer you see a neurosurgeon rather than an orthopedist.  Lets do a video visit for the next appointment.

## 2022-12-02 ENCOUNTER — OFFICE VISIT (OUTPATIENT)
Dept: NEUROSURGERY | Facility: CLINIC | Age: 72
End: 2022-12-02
Attending: PSYCHIATRY & NEUROLOGY
Payer: COMMERCIAL

## 2022-12-02 ENCOUNTER — MEDICAL CORRESPONDENCE (OUTPATIENT)
Dept: NEUROSURGERY | Facility: CLINIC | Age: 72
End: 2022-12-02

## 2022-12-02 VITALS
BODY MASS INDEX: 45.22 KG/M2 | WEIGHT: 255.2 LBS | DIASTOLIC BLOOD PRESSURE: 75 MMHG | HEART RATE: 83 BPM | HEIGHT: 63 IN | SYSTOLIC BLOOD PRESSURE: 147 MMHG | OXYGEN SATURATION: 95 %

## 2022-12-02 DIAGNOSIS — M48.062 SPINAL STENOSIS OF LUMBAR REGION WITH NEUROGENIC CLAUDICATION: ICD-10-CM

## 2022-12-02 PROCEDURE — 99204 OFFICE O/P NEW MOD 45 MIN: CPT | Performed by: NEUROLOGICAL SURGERY

## 2022-12-02 NOTE — LETTER
12/2/2022         RE: Shelby Underwood  [address] [place] [address]        Dear Colleague,    Thank you for referring your patient, Shelby Underwood, to the [facility] SPINE AND NEUROSURGERY. Please see a copy of my visit note below.    Ms. Underwood is a 72-year-old woman seen today for symptomatic neurogenic claudication secondary to two-level lumbar spinal stenosis.  History is reviewed in detail and is floridly positive for this problem which predominantly affects her legs.  She notes immediate onset of pain, numbness and weakness into her legs upon standing or walking any distance.  She volunteers a positive shopping cart sign.  She has great difficulty lying in bed because of increasing difficulty with her legs and has been forced to sleep sitting in a chair.  She complains also of mechanical low back pain but notes that this is not her predominant symptom or functional limitation.  She has undergone physical therapy for her back in the recent past without improvement and with possible exacerbation of her symptoms.  She has no history of prior lumbar injury or surgery.  She has no complaints of change in bowel or bladder control.  She has recently undergone a lumbar MRI scan.  She has no history of injections.    On examination, she is walking slowly with a cane.  She demonstrates symptoms of neurogenic claudication after walking more than several steps or standing for more than a moment.  She tends to favor lumbar flexion to diminish but not eliminate her symptoms.  Motor examination of her bilateral lower extremity shows 5/5 strength throughout including plantar and dorsiflexion at the ankles while weightbearing.  Proximal bilateral lower extremity strength is intact.  Straight leg raising sign is negative.  Deep tendon reflexes are 1+ and equal at the knees, trace and symmetric at the ankles bilaterally.  There are no long tract findings noted.    Review of her recent lumbar MRI scan on  November 13, 2022 shows multilevel lumbar spondylitic changes most pronounced at L3-4 where she has severe lumbar spinal stenosis on the basis of facet ligamentous hypertrophy.  She also has a grade 1 anterolisthesis noted at L4-5 with significant spinal stenosis at that level with the left side more affected than the right.  Neural foramen are narrowed but appear adequate.  The L4-5 foramen are the most stenotic with pseudo disc herniation in the inferior half of the foramen but no compelling evidence of focal neurologic impingement.    Assessment: Severe symptomatic neurogenic claudication from two-level lumbar spinal stenosis at L3-4 and L4-5.  I reviewed the clinical and radiographic findings in detail with the patient today in the office and discussed management alternatives, differential diagnosis, risks and benefits at some length.  I have recommended proceeding with minimally invasive lumbar decompression at L3-4 and L4-5 using a right-sided approach.  I spoke with the patient at length regarding the details of the procedure as well as the potential risks and benefits.  I told her that the risks of the procedure include failure to improve her symptoms, increased pain weakness or numbness, injury to the nerve roots or dural covering, infection, bleeding, creation or worsening of lumbar instability necessitating lumbar fusion surgery at a later date, etc.  She appeared to understand this discussion, asked appropriate questions which answered and wished to proceed with surgery as soon as practical.  She also watched the lumbar stenosis video while she was in the office today.    More than 45 minutes in total was spent reviewing the medical records and relevant imaging including cervical, thoracic and cerebral MRI scans.  Remainder the time was spent reviewing differential diagnosis, management alternatives, risks and benefits.      Again, thank you for allowing me to participate in the care of your patient.         Sincerely,        Tuan Bear MD

## 2022-12-02 NOTE — PROGRESS NOTES
Ms. Underwood is a 72-year-old woman seen today for symptomatic neurogenic claudication secondary to two-level lumbar spinal stenosis.  History is reviewed in detail and is floridly positive for this problem which predominantly affects her legs.  She notes immediate onset of pain, numbness and weakness into her legs upon standing or walking any distance.  She volunteers a positive shopping cart sign.  She has great difficulty lying in bed because of increasing difficulty with her legs and has been forced to sleep sitting in a chair.  She complains also of mechanical low back pain but notes that this is not her predominant symptom or functional limitation.  She has undergone physical therapy for her back in the recent past without improvement and with possible exacerbation of her symptoms.  She has no history of prior lumbar injury or surgery.  She has no complaints of change in bowel or bladder control.  She has recently undergone a lumbar MRI scan.  She has no history of injections.    On examination, she is walking slowly with a cane.  She demonstrates symptoms of neurogenic claudication after walking more than several steps or standing for more than a moment.  She tends to favor lumbar flexion to diminish but not eliminate her symptoms.  Motor examination of her bilateral lower extremity shows 5/5 strength throughout including plantar and dorsiflexion at the ankles while weightbearing.  Proximal bilateral lower extremity strength is intact.  Straight leg raising sign is negative.  Deep tendon reflexes are 1+ and equal at the knees, trace and symmetric at the ankles bilaterally.  There are no long tract findings noted.    Review of her recent lumbar MRI scan on November 13, 2022 shows multilevel lumbar spondylitic changes most pronounced at L3-4 where she has severe lumbar spinal stenosis on the basis of facet ligamentous hypertrophy.  She also has a grade 1 anterolisthesis noted at L4-5 with significant spinal  stenosis at that level with the left side more affected than the right.  Neural foramen are narrowed but appear adequate.  The L4-5 foramen are the most stenotic with pseudo disc herniation in the inferior half of the foramen but no compelling evidence of focal neurologic impingement.    Assessment: Severe symptomatic neurogenic claudication from two-level lumbar spinal stenosis at L3-4 and L4-5.  I reviewed the clinical and radiographic findings in detail with the patient today in the office and discussed management alternatives, differential diagnosis, risks and benefits at some length.  I have recommended proceeding with minimally invasive lumbar decompression at L3-4 and L4-5 using a right-sided approach.  I spoke with the patient at length regarding the details of the procedure as well as the potential risks and benefits.  I told her that the risks of the procedure include failure to improve her symptoms, increased pain weakness or numbness, injury to the nerve roots or dural covering, infection, bleeding, creation or worsening of lumbar instability necessitating lumbar fusion surgery at a later date, etc.  She appeared to understand this discussion, asked appropriate questions which answered and wished to proceed with surgery as soon as practical.  She also watched the lumbar stenosis video while she was in the office today.    More than 45 minutes in total was spent reviewing the medical records and relevant imaging including cervical, thoracic and cerebral MRI scans.  Remainder the time was spent reviewing differential diagnosis, management alternatives, risks and benefits.

## 2022-12-02 NOTE — PATIENT INSTRUCTIONS
Lumbar decompressive laminectomy L3-L5      Please review COMPLETE information about your proposed surgery, pre-operative requirements, post-operative course and expectations - available in a folder provided to you in clinic!    Your surgery scheduler will call you within 3 business days to begin the process of scheduling your surgery and appointments.     Pre-Operative    Pre-operative physical / Lab work with primary care physician within 30 days of surgical date.    If all pre-op appointments/test are not completed prior to your surgery date, you will be asked to reschedule your surgery.           As part of preparation for your upcoming procedure you are required to have a test for the novel Coronavirus/COVID-19.  The test needs to be completed within 4 days (96) hours of surgery.   We will assist you in scheduling this.   You may NOT receive the COVID-19 vaccine or booster 2 weeks before or after surgery.    Readiness Visits    Prior to surgery, you may have a telephone or in person readiness visit with our RN team to discuss your upcomming surgery, results of your pre-op physical, and lab work.   If you will require a collar/neck brace after surgery, you will be fitted for one at your readiness visit prior to surgery (scheduled by the surgery scheduler).     Shower procedure    Hibiclens shower: Use one packet the night before surgery and one packet the morning of surgery for a whole body shower. Avoid face, hair, and genitals.      Eating/Drinking    Stop all solid foods 8 hours before surgery.  Stop all clear liquids 2 hours prior to arrival time     Clear liquids include water, clear juice, black coffee, or clear tea without milk, Gatorade, clear soda.     Medications - please refer to the pre-operative medication instructions sheet in your folder    Hold Aspirin, NSAIDs (Advil/Ibuprofen, Indocin, Naproxen,Nuprin,Relafen/Nabumetone, Diclofenac,Meloxicam, Aleve, Celebrex) and all vitamins and supplements x  7-10 days prior to surgical date  You can take Tylenol (Acetaminophen) for pain up until the date of your surgery   Do not exceed 3,000 mg per day   Any other medications prescribed, please discuss with your primary care provider at your pre-operative physical. Please discuss when/if it is safe for you to stop taking blood thinners with your primary care provider.   We will NOT provide pain medications prior to surgery. We will prescribe post-op pain medications for up to 6 weeks after surgery.       FMLA/Short-term disability    If you are currently employed, you will likely need to be off work for 4-6 weeks for post-op recovery and healing.  Please fax any FMLA/short term disability paperwork to [phone], mail it into the clinic, drop it off in person, or send via a [facility] message.   You may also call our clinic with the date in which you'd like to return to work, and we can provide a work letter to your employer  We will support Short-Term Disability up to 12 weeks, beginning the date of your surgery. We do NOT support Long-Term Disability/Social Security Benefits.     Pain Management after surgery    Dealing with pain    As your body heals, you might feel a stabbing, burning, or aching pain. You may also have some numbness.  Everyone feels pain differently, we may ask you to rate your pain using a pain scale. This will let us know how much pain you feel.   Keep in mind that medicine won't take away all of your pain. It helps to try other ways to relax and ease pain.     Things to help with pain    After surgery, we will give you medicine for your pain. These medications work well, but they can make you drowsy, itchy, or sick to your stomach, and constipated. Try to take narcotics with food if they cause nausea.   For mild to moderate pain, you can take medication such as Tylenol or Ibuprofen. These can be used with narcotics and muscle relaxants. *If you have had a fusion: do NOT use NSAIDs for 6 months  after surgery.   Do NOT drive while taking narcotic pain medication  Do NOT drink alcohol while using narcotic pain medication  You can utilize ice as needed (no longer than 20 minutes at one time) you may apply this over your covered incision  Utilize heat for muscle spasms, do not apply heat over your incision  If a muscle relaxer is prescribed, please do NOT take it at the same time as your narcotic pain medication. Take them at least 90 minutes apart.   You may also use pain cream/patches on sore muscles. Do NOT apply these on your incision. Patches may be cut in 1/2 if needed.     *After your surgery, if you will be staying in-patient, a nursing team will be monitoring you closely throughout your stay and communicate your health status to your surgeon and other providers.  You will be seen by Advanced Practice Providers (e.g., nurse practitioners, clinic nurse specialists, and physician assistants) who will check on you regularly to assess the status of your surgical recovery.     Incision Care    Look at your incision site every day. You  may need a mirror, or family member to help you.   Do not submerge your incision in water such as pools, hot tubs, baths for at least 6 weeks or until incision is healed  You may get your incision wet in the shower. Allow water and soap to run over incision, and gently pat dry.   Remove the dressing the day after you are discharged from the hospital. Keep the incision clean and dry at all times. This may require several bandage changes.   Contact us right away if you have:   Fever over 101 degrees farenheit  Green or yellow drainage (pus) from your incision or increased bloody drainage   Redness, swelling, or warmth at your surgery site   Notify the clinic [phone].    Activity Restrictions    For the first 6 weeks, no lifting,pushing, or pulling > 5-10 pounds, no bending, twisting.  Use the stairs in moderation   Walking: Walking is the best way to start exercise after  surgery. Take short frequent walks. You may gradually increase the distance as tolerated. If you feel pain, decrease your activity, but DO NOT stop walking. Walking will help you regain strength.  Avoid prolonged positioning for longer than 30 minutes (change positions frequently while awake)  No contact sports until after follow up visit  No high impact activities such as; running/jogging, snowmobile or 4 chahal riding or any other recreational vehicles until deemed safe by your surgeon/care team.   Please call the clinic if you develop any of the following symptoms:  Swelling and/or warmth in one or both legs  Pain or tenderness in your leg, ankle, foot, or arm   Red or discolored/pale skin     Post-Op Follow Up Appointments    We will call you to schedule these appointments after your discharge from the hospital.   Incision assessment within 2 weeks with a Registered Nurse   6 week post-op with a Nurse Practitioner/Physician Assistant. Your surgeon will be available on this day.

## 2022-12-02 NOTE — NURSING NOTE
Neurosurgery consultation was requested by: Dr. Riojas   Pain: low back pain   Radicular Pain is present: bilateral posterior thigh pain  Lhermitte sign: no   Motor complaints: weakness in both legs.   Sensory complaints: denies numbness and tingling   Gait and balance issues: yes  Bowel or bladder issues: denies   Duration of SX is: 6 months   The symptoms are worse with: walking, stairs, and sleeping   The symptoms are better with: rest and tylenol   Injury: no   Severity is: chronic    Patient has tried the following conservative measures: none   GABY score is: 62%  DAYNE Olmedo

## 2022-12-05 ENCOUNTER — HOSPITAL ENCOUNTER (OUTPATIENT)
Facility: CLINIC | Age: 72
End: 2022-12-05
Attending: NEUROLOGICAL SURGERY | Admitting: NEUROLOGICAL SURGERY
Payer: COMMERCIAL

## 2022-12-06 ENCOUNTER — TELEPHONE (OUTPATIENT)
Dept: NEUROSURGERY | Facility: CLINIC | Age: 72
End: 2022-12-06

## 2022-12-06 NOTE — TELEPHONE ENCOUNTER
Patient is scheduled for surgery on 1/4/23. I gave the patient surgery details and she verbalized understanding.

## 2023-01-03 ENCOUNTER — TELEPHONE (OUTPATIENT)
Dept: NEUROSURGERY | Facility: CLINIC | Age: 73
End: 2023-01-03

## 2023-01-03 RX ORDER — PAROXETINE 20 MG/1
TABLET, FILM COATED ORAL EVERY MORNING
COMMUNITY
End: 2023-01-03 | Stop reason: HOSPADM

## 2023-02-13 ENCOUNTER — MEDICAL CORRESPONDENCE (OUTPATIENT)
Dept: NEUROSURGERY | Facility: CLINIC | Age: 73
End: 2023-02-13
Payer: COMMERCIAL

## 2023-04-04 ENCOUNTER — VIRTUAL VISIT (OUTPATIENT)
Dept: NEUROLOGY | Facility: CLINIC | Age: 73
End: 2023-04-04
Payer: COMMERCIAL

## 2023-04-04 DIAGNOSIS — M24.541 CONTRACTURE OF HAND JOINT, RIGHT: ICD-10-CM

## 2023-04-04 DIAGNOSIS — M79.641 PAIN OF RIGHT HAND: Primary | ICD-10-CM

## 2023-04-04 DIAGNOSIS — M48.062 SPINAL STENOSIS OF LUMBAR REGION WITH NEUROGENIC CLAUDICATION: ICD-10-CM

## 2023-04-04 PROCEDURE — 99214 OFFICE O/P EST MOD 30 MIN: CPT | Mod: VID

## 2023-04-04 NOTE — LETTER
4/4/2023         RE: Shelby Underwood  [address] [place] [address]        Dear Colleague,    Thank you for referring your patient, Shelby Underwood, to the [facility]. Please see a copy of my visit note below.          [facility]/[place]  Section of General Neurology  Return Patient  Virtual Visit        Brief history of symptoms: The patient was initially seen in neurologic consultation on September 6, 2022 for evaluation of right arm dysfunction and weakness with associated neck pain with additional complaint of bilateral hamstring pain. Please see the comprehensive neurologic consultation note from that date in the Epic records for details.      This patient complains of right hand contracture, left C8 distribution numbness, neck pain, and bilateral hamstring discomfort/weakness along with difficulty walking             Assessment and Plan:   Assessment:  Shelby Underwood is a 73 year old female returning via video visit to discuss her lumbar radiculopathy and RUE contracture.  Patient was previously referred to neurosurgery and a procedure was scheduled for January, however, heavy snow fall prevented patient from attending and she has not rescheduled since.  Patient's lumbar spine disease is severe and surgery is likely to help relieve her low back pain, ambulation difficulties and neurogenic claudication.  Advised patient to reschedule with Dr. Bear and proceed with the surgery as soon as able.     In regards to her R hand contracture, her symptoms have worsened to now causing imprinting of her fingernails into her palm.  Unfortunately, neurosurgery did not report on this finding.  The exact cause remains unknown.  EMG findings are suggestive of C7 and more so C8 distribution nerve injury in the right upper extremity.  Paraspinals essentially normal.  MRI of R brachial plexus normal.  MRI brain normal.  C spine MRI showing multiple levels of neural  foraminal narrowing with particular narrowings bilaterally at C3-C4, left greater than right at C4-C5, right greater than left at C6-C7, and on the left at C7-T1.    In summary, patient has RUE contracture that is worsening slowly.  Peripheral nerves from C7-C8 nerve roots are injured, but localization is unknown.  Unlikely to be from c-spine with normal paraspinals on EMG.  Unlikely to be from brachial plexus in setting of normal brachial plexus MRI.    Will send for an opinion from PMR with possible symptomatic treatment, as well as OT for therapy.  Will await PMR thoughts and consult neuromuscular if needed to assist with lesion localization.  Although with injury that has advanced to contracture.       Plan:  - Proceed with lumbar spine decompression with neurosurgery  - See PMR and OT for R hand contracture  - Return in 3-4 months for follow up      Video data:  3:42-4:13 PM (31 minutes)  Pt location: Home  Provider location: on site  Software used isis Ernandez D.O.  Resident Physician of Neurology  [facility]    Patient discussed with my supervising physician Dr. Luz, who agrees with the critical findings, assessment, and plan as documented in the note above or as otherwise in their attestation.        Interval history:   Patient met with neurosurgery and scheduled the surgery for early January 2023, however due to heavy snow fall, was unable to get to the hospital so her surgery was canceled.  Patient states that her low back pain is still present along with lower extremity weakness and claudication preventing walking of even short distances.  However she does say that maybe her lower extremity symptoms are little better than prior.  She states that her right upper extremity contracture has worsened since last visit causing now nail Gopi in the palm of her hand due contraction.  She remains unable to extend the fingers of her right hand, the pinky and ring  finger are being the worst.       Allergies:   Naproxen       Physical Exam:   Poor video feed, unable to reliably obtain         Data: Pertinent prior to visit   MRI brain  IMPRESSION:  1.  Normal MRI of the brain parenchyma.  2.  Mucosal thickening in the paranasal sinuses.     MRI cervical spine   IMPRESSION:  1.  Multilevel degenerative changes in the cervical spine as detailed above. Overall, degenerative findings appear similar to prior MR 10/24/2022.  2.  No abnormal T2 signal or enhancement appreciated in the cervical spinal cord.  3.  Mild spinal canal narrowings at C4-C5 and C5-C6.  4.  Multiple levels of neural foraminal narrowing with particular narrowings bilaterally at C3-C4, left greater than right at C4-C5, right greater than left at C6-C7, and on the left at C7-T1.  5.  Nodule in the left thyroid gland measuring up to at least 2.8 cm. Recommend further evaluation with thyroid ultrasound if this has not previously been performed.     MRI thoracic spine  IMPRESSION:  1.  No abnormal signal or enhancement appreciated in the thoracic spinal cord.  2.  Multilevel degenerative changes throughout the thoracic spine as detailed above.  3.  Mild spinal canal narrowing at T11-T12.  4.  Several levels of neural foraminal narrowing with particular narrowings bilaterally at T1-T2, on the right at T4-T5, and bilaterally at T11-T12.     MRI Lumbar Spine  IMPRESSION:  1.  Marked multilevel degenerative changes throughout the lumbar spine as detailed above. Grade 1 anterolisthesis of L3 on L4 and L4 on L5 likely due to degenerative facet arthropathy.  2.  Severe spinal canal narrowing at L3-L4 with moderate-to-severe spinal canal narrowings at L2-L3 and L4-L5. Mild spinal canal narrowing at L1-L2.  3.  Multiple levels of neural foraminal narrowing, most pronounced on the left at L5-S1.  4.  Mild edema around the degenerated bilateral L4-L5 facets.  5.  Overall, degenerative findings appear similar to prior MR  5/13/2021.     R brachial plexus MRI                                               IMPRESSION:  Unremarkable right brachial plexus MRI.      Procedures:  EMG and nerve conduction study -  This is an abnormal EMG.  The findings are suggestive of C7 and more so C8 distribution nerve injury in the right upper extremity.  Left upper extremity may also have a mild ulnar neuropathy.  An inferior trunk brachial plexus injury could possibly elicit similar symptoms but is less likely.  The small motor unit seen in the paraspinals are of unclear significance and clinical correlation is advised.                              Attestation with edits by Zeke Luz MD at 4/7/2023  7:54 AM:  Attending Attestation    I saw and evaluated the patient on 4/4/23 and agree with the findings and the plan of care as documented in the resident's note.       I personally reviewed vital signs, medications, labs and pertinent imaging.    Total person time on day of visit on video call and in chart review including time while not personally logged into chart: 35 minutes.      Festus Luz MD   of Neurology  [facility]        Again, thank you for allowing me to participate in the care of your patient.        Sincerely,        Kwan Ernandez MD

## 2023-04-04 NOTE — PROGRESS NOTES
[facility]/[place]  Section of General Neurology  Return Patient  Virtual Visit        Brief history of symptoms: The patient was initially seen in neurologic consultation on September 6, 2022 for evaluation of right arm dysfunction and weakness with associated neck pain with additional complaint of bilateral hamstring pain. Please see the comprehensive neurologic consultation note from that date in the Epic records for details.      This patient complains of right hand contracture, left C8 distribution numbness, neck pain, and bilateral hamstring discomfort/weakness along with difficulty walking             Assessment and Plan:   Assessment:  Shelby Underwood is a 73 year old female returning via video visit to discuss her lumbar radiculopathy and RUE contracture.  Patient was previously referred to neurosurgery and a procedure was scheduled for January, however, heavy snow fall prevented patient from attending and she has not rescheduled since.  Patient's lumbar spine disease is severe and surgery is likely to help relieve her low back pain, ambulation difficulties and neurogenic claudication.  Advised patient to reschedule with Dr. Bear and proceed with the surgery as soon as able.     In regards to her R hand contracture, her symptoms have worsened to now causing imprinting of her fingernails into her palm.  Unfortunately, neurosurgery did not report on this finding.  The exact cause remains unknown.  EMG findings are suggestive of C7 and more so C8 distribution nerve injury in the right upper extremity.  Paraspinals essentially normal.  MRI of R brachial plexus normal.  MRI brain normal.  C spine MRI showing multiple levels of neural foraminal narrowing with particular narrowings bilaterally at C3-C4, left greater than right at C4-C5, right greater than left at C6-C7, and on the left at C7-T1.    In summary, patient has RUE contracture that is worsening slowly.  Peripheral nerves from C7-C8  nerve roots are injured, but localization is unknown.  Unlikely to be from c-spine with normal paraspinals on EMG.  Unlikely to be from brachial plexus in setting of normal brachial plexus MRI.    Will send for an opinion from PMR with possible symptomatic treatment, as well as OT for therapy.  Will await PMR thoughts and consult neuromuscular if needed to assist with lesion localization.  Although with injury that has advanced to contracture.       Plan:  - Proceed with lumbar spine decompression with neurosurgery  - See PMR and OT for R hand contracture  - Return in 3-4 months for follow up      Video data:  3:42-4:13 PM (31 minutes)  Pt location: Home  Provider location: on site  Software used isis Ernandez D.O.  Resident Physician of Neurology  [facility]    Patient discussed with my supervising physician Dr. Luz, who agrees with the critical findings, assessment, and plan as documented in the note above or as otherwise in their attestation.        Interval history:   Patient met with neurosurgery and scheduled the surgery for early January 2023, however due to heavy snow fall, was unable to get to the hospital so her surgery was canceled.  Patient states that her low back pain is still present along with lower extremity weakness and claudication preventing walking of even short distances.  However she does say that maybe her lower extremity symptoms are little better than prior.  She states that her right upper extremity contracture has worsened since last visit causing now nail Gopi in the palm of her hand due contraction.  She remains unable to extend the fingers of her right hand, the pinky and ring finger are being the worst.       Allergies:   Naproxen       Physical Exam:   Poor video feed, unable to reliably obtain         Data: Pertinent prior to visit   MRI brain  IMPRESSION:  1.  Normal MRI of the brain parenchyma.  2.  Mucosal thickening in the paranasal  sinuses.     MRI cervical spine   IMPRESSION:  1.  Multilevel degenerative changes in the cervical spine as detailed above. Overall, degenerative findings appear similar to prior MR 10/24/2022.  2.  No abnormal T2 signal or enhancement appreciated in the cervical spinal cord.  3.  Mild spinal canal narrowings at C4-C5 and C5-C6.  4.  Multiple levels of neural foraminal narrowing with particular narrowings bilaterally at C3-C4, left greater than right at C4-C5, right greater than left at C6-C7, and on the left at C7-T1.  5.  Nodule in the left thyroid gland measuring up to at least 2.8 cm. Recommend further evaluation with thyroid ultrasound if this has not previously been performed.     MRI thoracic spine  IMPRESSION:  1.  No abnormal signal or enhancement appreciated in the thoracic spinal cord.  2.  Multilevel degenerative changes throughout the thoracic spine as detailed above.  3.  Mild spinal canal narrowing at T11-T12.  4.  Several levels of neural foraminal narrowing with particular narrowings bilaterally at T1-T2, on the right at T4-T5, and bilaterally at T11-T12.     MRI Lumbar Spine  IMPRESSION:  1.  Marked multilevel degenerative changes throughout the lumbar spine as detailed above. Grade 1 anterolisthesis of L3 on L4 and L4 on L5 likely due to degenerative facet arthropathy.  2.  Severe spinal canal narrowing at L3-L4 with moderate-to-severe spinal canal narrowings at L2-L3 and L4-L5. Mild spinal canal narrowing at L1-L2.  3.  Multiple levels of neural foraminal narrowing, most pronounced on the left at L5-S1.  4.  Mild edema around the degenerated bilateral L4-L5 facets.  5.  Overall, degenerative findings appear similar to prior MR 5/13/2021.     R brachial plexus MRI                                               IMPRESSION:  Unremarkable right brachial plexus MRI.      Procedures:  EMG and nerve conduction study -  This is an abnormal EMG.  The findings are suggestive of C7 and more so C8  distribution nerve injury in the right upper extremity.  Left upper extremity may also have a mild ulnar neuropathy.  An inferior trunk brachial plexus injury could possibly elicit similar symptoms but is less likely.  The small motor unit seen in the paraspinals are of unclear significance and clinical correlation is advised.

## 2023-04-04 NOTE — NURSING NOTE
Cam is a 73 year old who is being evaluated via a billable video visit.      How would you like to obtain your AVS? MyChart  If the video visit is dropped, the invitation should be resent by: Send to e-mail at: [email or web address]  Will anyone else be joining your video visit? No        Video-Visit Details    Type of service:  Video Visit   Video Start Time: 2:58 PM  Video End Time:2:58 PM    Originating Location (pt. Location): Home    Distant Location (provider location):  On-site  Platform used for Video Visit: [employer]

## 2023-04-05 ENCOUNTER — TELEPHONE (OUTPATIENT)
Dept: NEUROSURGERY | Facility: CLINIC | Age: 73
End: 2023-04-05
Payer: COMMERCIAL

## 2023-04-05 ENCOUNTER — TELEPHONE (OUTPATIENT)
Dept: PHYSICAL MEDICINE AND REHAB | Facility: CLINIC | Age: 73
End: 2023-04-05
Payer: COMMERCIAL

## 2023-04-05 NOTE — TELEPHONE ENCOUNTER
[facility] Call Center    Phone Message    May a detailed message be left on voicemail: yes     Reason for Call: Other: Pt calling to schedule her surgery      Action Taken: Other: ortho     Travel Screening: Not Applicable                                                                       n/a

## 2023-04-05 NOTE — TELEPHONE ENCOUNTER
[facility] Call Center    Phone Message    May a detailed message be left on voicemail: yes     Reason for Call: Other: A referral has been placed for this patient to schedule to discuss botox or other options for hand contracture     Please review and contact patient for scheduling    Action Taken: Other: Routed to [facility]    Travel Screening: Not Applicable

## 2023-04-17 NOTE — PROGRESS NOTES
[facility] Hand Therapy Initial Evaluation     Current Date: 4/17/2023    Diagnosis: Right hand pain, joint contracture  DOI: Symptoms for approximately one year, 1.5 years    Subjective:  Patient Health History  Shelby Underwood being seen for Hand Problem.       Problem occurred: Don't Know   Pain is reported as 2/10 on pain scale.  General health as reported by patient is good.  Pertinent medical history includes: concussions/dizziness.     Medical allergies: none.   Surgeries include:  Orthopedic surgery.    Current medications:  Anti-depressants and sleep medication.    Current occupation is Retired.   Primary job tasks include:  Computer work, prolonged sitting and other.   Other job/home tasks details: Household Tasks.                Occupational Profile Information:  Right hand dominant  Prior functional level:  no limitations  Patient reports symptoms of pain, stiffness/loss of motion, weakness/loss of strength, numbness and tingling   Special tests:  EMG and MRI.    Previous treatment: Independent stretching  Barriers include:none  Mobility: No difficulty, recent falls   Transportation: drives  Currently retired  Leisure activities/hobbies: Bowling, reading  Other: Is worried that symptoms are beginning on the left, as well (numbness, tingling, stiffness to RF/SF), is due for lumbar spine surgery, still to be scheduled.     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  Paraspinals essentially normal.  MRI of R brachial plexus normal.  MRI brain normal.  C spine MRI showing multiple levels of neural foraminal narrowing with particular narrowings bilaterally at C3-C4, left greater than right at C4-C5, right greater than left at C6-C7, and on the left at C7-T1.    Pain Level  (Scale 0-10)   4/18/2023   At Rest 0/10   With Use 3/10     Pain Description  Date 4/18/2023   Location ring finger and small finger, primarily dorsal   Pain Quality Aching and tight   Frequency Intermittent, with passive digital extension    Pain is worst  daytime   Exacerbated by  Pain with passive RF/SF   Relieved by rest   Progression Gradually worsening     Edema  None    Sensation   Decreased Ulnar Nerve distribution per pt report, tightness to dorsal ring and small fingers, R > L    ROM  Hand 4/19/2023 4/19/2023   AROM(PROM) L R   Index MP / /   PIP / /   DIP / /   ADEN     Long MP / /   PIP / /   DIP / /   ADEN     Ring MP / 87 (24)/   PIP / 90 (25)/   DIP / 41 (20)/   ADEN     Small MP / 90(30)/   PIP / 88 (26)/   DIP / 44 (20)/   ADEN         Observations:  Positive interosseus atrophy, positive Heberden's nodules noted frankly to the digits PIP and DIP, positive 1st CMC joint collapse with resisted pinch and shoulder deformity noted. Mild hypothenar atrophy, negative clawing.       Assessment:  Patient presents with symptoms consistent with diagnosis of right hand  pain,  with conservative intervention.     Patient's limitations or Problem List includes:  Pain, Decreased ROM/motion, Weakness, Sensory disturbance, Hypomobility, Decreased , Decreased pinch, Decreased coordination, Decreased dexterity and Tightness in musculature of the right wrist and hand which interferes with the patient's ability to perform Self Care Tasks (dressing, eating, bathing, hygiene/toileting), Sleep Patterns, Recreational Activities, Household Chores and Driving  as compared to previous level of function.    Rehab Potential:  Good - Return to full activity, some limitations    Patient will benefit from skilled Occupational Therapy to increase ROM, flexibility, motion,  strength, pinch strength, stability of thumb, coordination, dexterity and sensation and decrease pain to return to previous activity level and resume normal  daily tasks and to reach their rehab potential.    Barriers to Learning:  No barrier    Communication Issues:  Patient appears to be able to clearly communicate and understand verbal and written communication and follow directions correctly.    Chart Review: Chart Review, Expanded review of medical and/or therapy records and additional review of physical, cognitive or psychosocial history related to current functional performance and Detailed history review with family / caregivers    Identified Performance Deficits: bathing/showering, toileting, dressing, feeding, personal device care, communication management, driving and community mobility, health management and maintenance, home establishment and management, meal preparation and cleanup, shopping, sleep, work, leisure activities and social participation    Assessment of Occupational Performance:  3-5 Performance Deficits    Clinical Decision Making (Complexity): Moderate complexity    Treatment Explanation:  The following has been discussed with the patient:  RX ordered/plan of care  Anticipated outcomes  Possible risks and side effects    Plan:  Frequency:  1 X week, once daily  Duration:  for 8 weeks    Treatment Plan:   Modalities:  NMES  Therapeutic Exercise:  AROM, AAROM, PROM, Tendon Gliding, Blocking, Reverse Blocking, Place and Hold, Isotonics and Isometrics  Neuromuscular re-education:  Nerve Gliding, Coordination/Dexterity, Sensory re-education, Proprioceptive Training, Posture, Isometrics and Stabilization  Manual Techniques:  Coordination/Dexterity and Joint mobilization  Orthotic Fabrication:  Static and Forearm based  Self Care:  Self Care Tasks and Ergonomic Considerations    Discharge Plan:  Achieve all LTG.  Independent in home treatment program.  Reach maximal therapeutic benefit.    Home Exercise Program:  Resting hand orthosis to the RUE with sleep and at quiet rest, otherwise may engage this involved upper extremity in functional activity to  tolerance. Will evaluate tolerance and improvements in ROM at next scheduled treatment, progressing ROM exercise, potential NMES as indicated.     Next Visit:  Orthotic adjustment PRN  Consider NMES   Update HEP as indicated

## 2023-04-19 ENCOUNTER — THERAPY VISIT (OUTPATIENT)
Dept: OCCUPATIONAL THERAPY | Facility: CLINIC | Age: 73
End: 2023-04-19
Attending: STUDENT IN AN ORGANIZED HEALTH CARE EDUCATION/TRAINING PROGRAM
Payer: COMMERCIAL

## 2023-04-19 DIAGNOSIS — M24.541 CONTRACTURE OF HAND JOINT, RIGHT: ICD-10-CM

## 2023-04-19 DIAGNOSIS — M79.641 PAIN OF RIGHT HAND: ICD-10-CM

## 2023-04-19 PROCEDURE — 97166 OT EVAL MOD COMPLEX 45 MIN: CPT | Mod: GO | Performed by: OCCUPATIONAL THERAPIST

## 2023-04-19 PROCEDURE — 97760 ORTHOTIC MGMT&TRAING 1ST ENC: CPT | Mod: GO | Performed by: OCCUPATIONAL THERAPIST

## 2023-04-19 PROCEDURE — 97535 SELF CARE MNGMENT TRAINING: CPT | Mod: GO | Performed by: OCCUPATIONAL THERAPIST

## 2023-04-19 NOTE — PROGRESS NOTES
[facility]    OUTPATIENT Occupational Therapy ORTHOPEDIC EVALUATION  PLAN OF TREATMENT FOR OUTPATIENT REHABILITATION  (COMPLETE FOR INITIAL CLAIMS ONLY)  Patient's Last Name, First Name, M.I.  YOB: 1950  Shelby Underwood    Provider s Name:  [facility]   Medical Record No.  [id number]   Start of Care Date:  04/19/23   Onset Date:    (4/4/2023)   Treatment Diagnosis:  Right Hand Pain Medical Diagnosis:     Pain of right hand  Contracture of hand joint, right       Goals:     04/19/23 0500   Goal #1   Goal #1 toileting   Previous Performance Level Independent   Current Functional Task Manipulate   Current Performance Level Severely difficult   STG Target Performance Toilet paper   STG Target Perform Level Moderately difficult   Due Date 05/31/23   LTG Target Task/Performance Other - on additional line   Other Toileting Minimally difficult   Due Date 06/14/23         Therapy Frequency:  1x/week  Predicted Duration of Therapy Intervention:  8 weeks    Heriberto Lay OT                 I CERTIFY THE NEED FOR THESE SERVICES FURNISHED UNDER        THIS PLAN OF TREATMENT AND WHILE UNDER MY CARE     (Physician attestation of this document indicates review and certification of the therapy plan).                     Certification Date From:  04/19/23   Certification Date To:  06/14/23    Referring Provider:  Zeke Luz    Initial Assessment        See Epic Evaluation SOC Date: 04/19/23

## 2023-04-27 ENCOUNTER — THERAPY VISIT (OUTPATIENT)
Dept: OCCUPATIONAL THERAPY | Facility: CLINIC | Age: 73
End: 2023-04-27
Payer: COMMERCIAL

## 2023-04-27 ENCOUNTER — TELEPHONE (OUTPATIENT)
Dept: NEUROSURGERY | Facility: CLINIC | Age: 73
End: 2023-04-27

## 2023-04-27 DIAGNOSIS — M24.541 CONTRACTURE OF HAND JOINT, RIGHT: ICD-10-CM

## 2023-04-27 DIAGNOSIS — M79.641 PAIN OF RIGHT HAND: Primary | ICD-10-CM

## 2023-04-27 PROCEDURE — 97535 SELF CARE MNGMENT TRAINING: CPT | Mod: GO | Performed by: OCCUPATIONAL THERAPIST

## 2023-04-27 PROCEDURE — 97110 THERAPEUTIC EXERCISES: CPT | Mod: GO | Performed by: OCCUPATIONAL THERAPIST

## 2023-04-27 NOTE — TELEPHONE ENCOUNTER
[facility] Call Center    Phone Message    May a detailed message be left on voicemail: yes     Reason for Call: Other: Hello, Patient has been calling to get her surgery scheduled but no one has called. Can we please have someone call her to get her scheduled ASAP. Thank you,     Action Taken: Other: ns clinical    Travel Screening: Not Applicable

## 2023-05-09 ENCOUNTER — TELEPHONE (OUTPATIENT)
Dept: NEUROLOGY | Facility: CLINIC | Age: 73
End: 2023-05-09
Payer: COMMERCIAL

## 2023-05-09 NOTE — TELEPHONE ENCOUNTER
Pt was supposed to do surgery in January with Dr. Bear, this was cancelled due to weather.  She was due to follow up with Dr. Ernandez following surgery.     Pt states she has been trying to call and reschedule, but keeps getting bounced around, and has not heard anything.     She is having a lot of back pain, and she is willing to see any provider if that will get her surgery faster.     RN will route to neurosurgery team to see if they can assist patient further.    Carmelina LARSON RN, BSN  [facility]

## 2023-05-11 ENCOUNTER — THERAPY VISIT (OUTPATIENT)
Dept: OCCUPATIONAL THERAPY | Facility: CLINIC | Age: 73
End: 2023-05-11
Payer: COMMERCIAL

## 2023-05-11 DIAGNOSIS — M24.541 CONTRACTURE OF HAND JOINT, RIGHT: ICD-10-CM

## 2023-05-11 DIAGNOSIS — M79.641 PAIN OF RIGHT HAND: Primary | ICD-10-CM

## 2023-05-11 PROCEDURE — 97535 SELF CARE MNGMENT TRAINING: CPT | Mod: GO | Performed by: OCCUPATIONAL THERAPIST

## 2023-05-11 PROCEDURE — 97110 THERAPEUTIC EXERCISES: CPT | Mod: GO | Performed by: OCCUPATIONAL THERAPIST

## 2023-05-11 NOTE — PROGRESS NOTES
SOAP Note - Hand Therapy    Current Date:  5/11/2023    ROM  Hand 4/19/2023 4/19/2023 5/11/2023   AROM(PROM) L R R   Index MP / /    PIP / /    DIP / /    ADEN      Long MP / /    PIP / /    DIP / /    ADEN      Ring MP / 87 (24)/ -35/   PIP / 90 (25)/ -50/   DIP / 41 (20)/ -20/   ADEN      Small MP / 90(30)/ 65/   PIP / 88 (26)/ 20/   DIP / 44 (20)/ 20/   ADEN        Observations: Dorsal intermetacarpal interosseus atrophy, WFL active wrist flexion, extension.     Please refer to the daily flowsheet for treatment today, total treatment time and time spent performing 1:1 timed codes.

## 2023-05-19 ENCOUNTER — OFFICE VISIT (OUTPATIENT)
Dept: NEUROSURGERY | Facility: CLINIC | Age: 73
End: 2023-05-19
Payer: COMMERCIAL

## 2023-05-19 VITALS — OXYGEN SATURATION: 98 % | DIASTOLIC BLOOD PRESSURE: 72 MMHG | SYSTOLIC BLOOD PRESSURE: 154 MMHG | HEART RATE: 67 BPM

## 2023-05-19 DIAGNOSIS — E04.1 THYROID NODULE: Primary | ICD-10-CM

## 2023-05-19 DIAGNOSIS — M48.062 SPINAL STENOSIS OF LUMBAR REGION WITH NEUROGENIC CLAUDICATION: ICD-10-CM

## 2023-05-19 PROCEDURE — 99214 OFFICE O/P EST MOD 30 MIN: CPT | Performed by: SURGERY

## 2023-05-19 ASSESSMENT — PAIN SCALES - GENERAL: PAINLEVEL: MILD PAIN (3)

## 2023-05-19 NOTE — NURSING NOTE
"Shelby Underwood is a 73 year old female who presents for:  Chief Complaint   Patient presents with     Consult        Initial Vitals:  BP (!) 154/72   Pulse 67   SpO2 98%  Estimated body mass index is 45.21 kg/m  as calculated from the following:    Height as of 12/2/22: 5' 3\" (1.6 m).    Weight as of 12/2/22: 255 lb 3.2 oz (115.8 kg).. There is no height or weight on file to calculate BSA. BP completed using cuff size: large  Mild Pain (3)    Sixto Jaramillo    "

## 2023-05-19 NOTE — LETTER
5/19/2023         RE: Shelby Underwood  [address] [place] [address]        Dear Colleague,    Thank you for referring your patient, Shelby Underwood, to the [facility]ERY. Please see a copy of my visit note below.      The patient is a 73-year-old female.  She has spinal stenosis especially at L2-3 and L3-4 and L4-5 with foramen stenosis at 2 3 on the right, 3 4 on the right, and 4 5 bilateral.  She also has listhesis of 3 on 4 and 4 on 5.    We plan to get flexion-extension views if she is going to have surgery.  For now she is not a great candidate for surgery because  she is morbidly obese with a BMI is 45.  I spent quite a bit of time counseling on weight loss.  She has a thyroid nodule on cervical MRI.  I ordered a thyroid ultrasound.  She is planning on following up with Dr. Kapoor.  She is going to the spine center for conservative treatment while she waits for her BMI to get down to the 35 range which I requested for surgery.  She is satisfied with the plan.  Total time 25 minutes, more than 50% spent counseling and/or coordinating care.      Again, thank you for allowing me to participate in the care of your patient.        Sincerely,        Kvng Sandoval MD

## 2023-05-24 ENCOUNTER — HOSPITAL ENCOUNTER (OUTPATIENT)
Dept: ULTRASOUND IMAGING | Facility: HOSPITAL | Age: 73
Discharge: HOME | End: 2023-05-24
Attending: SURGERY | Admitting: SURGERY
Payer: COMMERCIAL

## 2023-05-24 DIAGNOSIS — E04.1 THYROID NODULE: ICD-10-CM

## 2023-05-24 PROCEDURE — 76536 US EXAM OF HEAD AND NECK: CPT

## 2023-05-25 ENCOUNTER — THERAPY VISIT (OUTPATIENT)
Dept: OCCUPATIONAL THERAPY | Facility: CLINIC | Age: 73
End: 2023-05-25
Payer: COMMERCIAL

## 2023-05-25 DIAGNOSIS — M24.541 CONTRACTURE OF HAND JOINT, RIGHT: ICD-10-CM

## 2023-05-25 DIAGNOSIS — M79.641 PAIN OF RIGHT HAND: Primary | ICD-10-CM

## 2023-05-25 LAB — RADIOLOGIST FLAGS: ABNORMAL

## 2023-05-25 PROCEDURE — 97763 ORTHC/PROSTC MGMT SBSQ ENC: CPT | Mod: GO | Performed by: OCCUPATIONAL THERAPIST

## 2023-05-31 ENCOUNTER — THERAPY VISIT (OUTPATIENT)
Dept: OCCUPATIONAL THERAPY | Facility: CLINIC | Age: 73
End: 2023-05-31
Payer: COMMERCIAL

## 2023-05-31 DIAGNOSIS — M24.541 CONTRACTURE OF HAND JOINT, RIGHT: ICD-10-CM

## 2023-05-31 DIAGNOSIS — M79.641 PAIN OF RIGHT HAND: Primary | ICD-10-CM

## 2023-05-31 PROCEDURE — 97763 ORTHC/PROSTC MGMT SBSQ ENC: CPT | Mod: GO | Performed by: OCCUPATIONAL THERAPIST

## 2023-06-15 ENCOUNTER — THERAPY VISIT (OUTPATIENT)
Dept: OCCUPATIONAL THERAPY | Facility: CLINIC | Age: 73
End: 2023-06-15
Payer: COMMERCIAL

## 2023-06-15 DIAGNOSIS — M79.641 PAIN OF RIGHT HAND: Primary | ICD-10-CM

## 2023-06-15 DIAGNOSIS — M24.541 CONTRACTURE OF HAND JOINT, RIGHT: ICD-10-CM

## 2023-06-15 PROCEDURE — 97110 THERAPEUTIC EXERCISES: CPT | Mod: GO | Performed by: OCCUPATIONAL THERAPIST

## 2023-06-15 PROCEDURE — 97032 APPL MODALITY 1+ESTIM EA 15: CPT | Mod: GO | Performed by: OCCUPATIONAL THERAPIST

## 2023-06-15 NOTE — PROGRESS NOTES
06/15/23 0500   Appointment Info   Treating Provider DEMETRIUS Norris, OTR/L   Total/Authorized Visits 14 (POC+8)   Visits Used 6   Medical Diagnosis Right hand pain   OT Tx Diagnosis Right hand pain   Quick Add  Certification   Progress Note/Certification   Onset of Illness/Injury or Date of Surgery 04/04/23  (Referral)   Therapy Frequency 1x/week   Predicted Duration Additional 8 weeks   Certification date from 06/15/23   Certification date to 08/10/23   Progress Note Due Date 08/10/23   Progress Note Completed Date 06/15/23   Goals   OT Goals 1;2   OT Goal 1   Goal Identifier STG   Goal Description Patient will manipulate toilet paper on a roll with moderate difficulty.   Rationale In order to maximize safety and independence with performance of self-care activities   Goal Progress Achieved 6.15   Target Date 05/31/23   Date Met 06/15/23   OT Goal 2   Goal Identifier LTG   Goal Description Patient will manipulate toilet paper on a roll with mild difficulty.   Rationale In order to maximize safety and independence with performance of self-care activities   Goal Progress Progressing, goal extended   Target Date 06/14/23   Subjective Report   Subjective Report I like the night splint better, but both feel good and like they're helping.   Objective Measures   Objective Measures Objective Measure 1;Objective Measure 2;Objective Measure 3;Objective Measure 4;Objective Measure 5;Objective Measure 6   Objective Measure 1   Objective Measure -29/97   Details R RF MCP active extension, flexion   Objective Measure 2   Objective Measure -20/109   Details R RF PIP active extension, flexion   Objective Measure 3   Objective Measure -5/70   Details R RF DIP active extension, flexion   Objective Measure 4   Objective Measure -33/99   Details R SF MCP active extension, flexion   Objective Measure 5   Objective Measure -10/103   Details R SF PIP active extension, flexion   Objective Measure 6   Objective Measure -5/64    Details R SF DIP active extension, flexion   OT Modalities   OT Modalities Electrical Stimulation    Electrical Stimulation   E-Stim, Attended Minutes (61238) 17   Electrical Stim -Type NMES   Patient Response/Progress Tolerated well   Intensity 40   Duration 15 min   Frequency/Pattern Premod   Location Stimulation of EDC facilitating active extension   Positioning sitting   Electrode placement Dorsal forearm, proximal eletrode towards LEP   Treatment Interventions (OT)   Interventions Therapeutic Procedure/Exercise   Therapeutic Procedure/Exercise   PTRx Ther Proc 1 Scalene Stretch   PTRx Ther Proc 1 - Details HEP   PTRx Ther Proc 2 Wheelchair Anterior Shoulder Stretch   PTRx Ther Proc 2 - Details HEP   PTRx Ther Proc 3 Scapular Retraction/Depression   PTRx Ther Proc 3 - Details HEP   Therapeutic Procedure: strength, endurance, ROM, flexibillity minutes (88859) 15   Skilled Intervention For increased independence with ADL, iADL.   Patient Response/Progress Tolerated well   Ther Proc 1 ROM re-assessment to gauge progress and improving neuromuscular control.   Therapeutic Activity   PTRx Ther Act 1 Education Sheet General   PTRx Ther Act 1 - Details HEP   PTRx Ther Act 2 Education Sheet Wrist   PTRx Ther Act 2 - Details HEP   Education   Learner/Method No Barriers to Learning   Plan   Home program Per PTRX, resting orthosis at night, dynamic orthosis during the day as above, increase wear to tolerance.   Total Session Time   Timed Code Treatment Minutes 32   Total Treatment Time (sum of timed and untimed services) 32         [facility]                                                                                   OUTPATIENT OCCUPATIONAL THERAPY    PLAN OF TREATMENT FOR OUTPATIENT REHABILITATION   Patient's Last Name, First Name, Shelby Zepeda YOB: 1950   Provider's Name   [facility]   Medical Record No.  [id number]     Onset  Date: 04/04/23 (Referral) Start of Care Date:       Medical Diagnosis:  Right hand pain      OT Treatment Diagnosis:  Right hand pain Plan of Treatment  Frequency/Duration:1x/week/Additional 8 weeks    Certification date from 06/15/23   To 08/10/23        See note for plan of treatment details and functional goals     Heriberto Lay OT                         I CERTIFY THE NEED FOR THESE SERVICES FURNISHED UNDER        THIS PLAN OF TREATMENT AND WHILE UNDER MY CARE     (Physician attestation of this document indicates review and certification of the therapy plan).                Referring Provider:  Zeke Luz      Initial Assessment  See Epic Evaluation-

## 2023-06-22 ENCOUNTER — THERAPY VISIT (OUTPATIENT)
Dept: OCCUPATIONAL THERAPY | Facility: CLINIC | Age: 73
End: 2023-06-22
Payer: COMMERCIAL

## 2023-06-22 DIAGNOSIS — M24.541 CONTRACTURE OF HAND JOINT, RIGHT: ICD-10-CM

## 2023-06-22 DIAGNOSIS — M79.641 PAIN OF RIGHT HAND: Primary | ICD-10-CM

## 2023-06-22 PROCEDURE — 97140 MANUAL THERAPY 1/> REGIONS: CPT | Mod: GO | Performed by: OCCUPATIONAL THERAPIST

## 2023-06-22 PROCEDURE — 97032 APPL MODALITY 1+ESTIM EA 15: CPT | Mod: GO | Performed by: OCCUPATIONAL THERAPIST

## 2023-06-29 ENCOUNTER — THERAPY VISIT (OUTPATIENT)
Dept: OCCUPATIONAL THERAPY | Facility: CLINIC | Age: 73
End: 2023-06-29
Payer: COMMERCIAL

## 2023-06-29 DIAGNOSIS — M24.541 CONTRACTURE OF HAND JOINT, RIGHT: ICD-10-CM

## 2023-06-29 DIAGNOSIS — M79.641 PAIN OF RIGHT HAND: Primary | ICD-10-CM

## 2023-06-29 PROCEDURE — 97763 ORTHC/PROSTC MGMT SBSQ ENC: CPT | Mod: GO | Performed by: OCCUPATIONAL THERAPIST

## 2023-07-05 ENCOUNTER — THERAPY VISIT (OUTPATIENT)
Dept: OCCUPATIONAL THERAPY | Facility: CLINIC | Age: 73
End: 2023-07-05
Payer: COMMERCIAL

## 2023-07-05 DIAGNOSIS — M79.641 PAIN OF RIGHT HAND: Primary | ICD-10-CM

## 2023-07-05 DIAGNOSIS — M24.541 CONTRACTURE OF HAND JOINT, RIGHT: ICD-10-CM

## 2023-07-05 PROCEDURE — 97530 THERAPEUTIC ACTIVITIES: CPT | Mod: GO | Performed by: OCCUPATIONAL THERAPIST

## 2023-07-05 PROCEDURE — 97110 THERAPEUTIC EXERCISES: CPT | Mod: 59 | Performed by: OCCUPATIONAL THERAPIST

## 2023-09-17 ENCOUNTER — HEALTH MAINTENANCE LETTER (OUTPATIENT)
Age: 73
End: 2023-09-17

## 2023-10-08 PROBLEM — M79.641 PAIN OF RIGHT HAND: Status: RESOLVED | Noted: 2023-04-19 | Resolved: 2023-10-08

## 2023-10-08 PROBLEM — M24.541 CONTRACTURE OF HAND JOINT, RIGHT: Status: RESOLVED | Noted: 2023-04-19 | Resolved: 2023-10-08

## 2024-02-27 ENCOUNTER — LAB REQUISITION (OUTPATIENT)
Dept: LAB | Facility: CLINIC | Age: 74
End: 2024-02-27
Payer: COMMERCIAL

## 2024-02-27 DIAGNOSIS — R53.1 WEAKNESS: ICD-10-CM

## 2024-02-28 LAB
ANION GAP SERPL CALCULATED.3IONS-SCNC: 13 MMOL/L (ref 7–15)
BUN SERPL-MCNC: 10.8 MG/DL (ref 8–23)
CALCIUM SERPL-MCNC: 9.4 MG/DL (ref 8.8–10.2)
CHLORIDE SERPL-SCNC: 102 MMOL/L (ref 98–107)
CREAT SERPL-MCNC: 0.72 MG/DL (ref 0.51–0.95)
DEPRECATED HCO3 PLAS-SCNC: 23 MMOL/L (ref 22–29)
EGFRCR SERPLBLD CKD-EPI 2021: 87 ML/MIN/1.73M2
GLUCOSE SERPL-MCNC: 113 MG/DL (ref 70–99)
POTASSIUM SERPL-SCNC: 4.7 MMOL/L (ref 3.4–5.3)
SODIUM SERPL-SCNC: 138 MMOL/L (ref 135–145)

## 2024-02-28 PROCEDURE — 36415 COLL VENOUS BLD VENIPUNCTURE: CPT | Mod: ORL | Performed by: FAMILY MEDICINE

## 2024-02-28 PROCEDURE — P9604 ONE-WAY ALLOW PRORATED TRIP: HCPCS | Mod: ORL | Performed by: FAMILY MEDICINE

## 2024-02-28 PROCEDURE — 80048 BASIC METABOLIC PNL TOTAL CA: CPT | Mod: ORL | Performed by: FAMILY MEDICINE

## 2024-03-09 ENCOUNTER — LAB REQUISITION (OUTPATIENT)
Dept: LAB | Facility: CLINIC | Age: 74
End: 2024-03-09
Payer: COMMERCIAL

## 2024-03-09 DIAGNOSIS — R10.2 PELVIC AND PERINEAL PAIN: ICD-10-CM

## 2024-03-09 LAB
ALBUMIN UR-MCNC: 30 MG/DL
APPEARANCE UR: ABNORMAL
BACTERIA #/AREA URNS HPF: ABNORMAL /HPF
BILIRUB UR QL STRIP: NEGATIVE
CAOX CRY #/AREA URNS HPF: ABNORMAL /HPF
COLOR UR AUTO: YELLOW
GLUCOSE UR STRIP-MCNC: NEGATIVE MG/DL
HGB UR QL STRIP: ABNORMAL
KETONES UR STRIP-MCNC: NEGATIVE MG/DL
LEUKOCYTE ESTERASE UR QL STRIP: ABNORMAL
MUCOUS THREADS #/AREA URNS LPF: PRESENT /LPF
NITRATE UR QL: POSITIVE
PH UR STRIP: 5.5 [PH] (ref 5–7)
RBC URINE: 9 /HPF
SP GR UR STRIP: 1.03 (ref 1–1.03)
SQUAMOUS EPITHELIAL: 8 /HPF
TRANSITIONAL EPI: <1 /HPF
UROBILINOGEN UR STRIP-MCNC: NORMAL MG/DL
WBC CLUMPS #/AREA URNS HPF: PRESENT /HPF
WBC URINE: >182 /HPF

## 2024-03-09 PROCEDURE — 87086 URINE CULTURE/COLONY COUNT: CPT | Mod: ORL | Performed by: FAMILY MEDICINE

## 2024-03-09 PROCEDURE — 81001 URINALYSIS AUTO W/SCOPE: CPT | Mod: ORL | Performed by: FAMILY MEDICINE

## 2024-03-10 LAB — BACTERIA UR CULT: NORMAL

## 2024-09-01 ENCOUNTER — HEALTH MAINTENANCE LETTER (OUTPATIENT)
Age: 74
End: 2024-09-01

## 2024-11-10 ENCOUNTER — HEALTH MAINTENANCE LETTER (OUTPATIENT)
Age: 74
End: 2024-11-10